# Patient Record
Sex: FEMALE | Race: WHITE | NOT HISPANIC OR LATINO | Employment: OTHER | URBAN - METROPOLITAN AREA
[De-identification: names, ages, dates, MRNs, and addresses within clinical notes are randomized per-mention and may not be internally consistent; named-entity substitution may affect disease eponyms.]

---

## 2021-09-18 ENCOUNTER — OFFICE VISIT (OUTPATIENT)
Dept: FAMILY MEDICINE CLINIC | Facility: CLINIC | Age: 31
End: 2021-09-18

## 2021-09-18 VITALS
HEIGHT: 65 IN | OXYGEN SATURATION: 98 % | WEIGHT: 105.8 LBS | SYSTOLIC BLOOD PRESSURE: 118 MMHG | BODY MASS INDEX: 17.63 KG/M2 | DIASTOLIC BLOOD PRESSURE: 60 MMHG | HEART RATE: 56 BPM

## 2021-09-18 DIAGNOSIS — R10.9 CHRONIC ABDOMINAL PAIN: ICD-10-CM

## 2021-09-18 DIAGNOSIS — R63.6 UNDERWEIGHT: ICD-10-CM

## 2021-09-18 DIAGNOSIS — E34.8 CYST OF PINEAL GLAND: ICD-10-CM

## 2021-09-18 DIAGNOSIS — E88.89 CYP2D6 POOR METABOLIZER (HCC): ICD-10-CM

## 2021-09-18 DIAGNOSIS — F07.81 POSTCONCUSSION SYNDROME: ICD-10-CM

## 2021-09-18 DIAGNOSIS — R11.0 CHRONIC NAUSEA: Primary | ICD-10-CM

## 2021-09-18 DIAGNOSIS — G54.1 GENITOFEMORAL NEUROPATHY: ICD-10-CM

## 2021-09-18 DIAGNOSIS — G89.29 CHRONIC ABDOMINAL PAIN: ICD-10-CM

## 2021-09-18 DIAGNOSIS — G43.719 CHRONIC MIGRAINE WITHOUT AURA, INTRACTABLE, WITHOUT STATUS MIGRAINOSUS: ICD-10-CM

## 2021-09-18 PROBLEM — G44.309 POST-TRAUMATIC HEADACHE: Status: ACTIVE | Noted: 2020-04-16

## 2021-09-18 PROBLEM — G57.80: Status: ACTIVE | Noted: 2021-09-18

## 2021-09-18 PROBLEM — F32.A DEPRESSION: Status: ACTIVE | Noted: 2019-09-03

## 2021-09-18 PROCEDURE — 99204 OFFICE O/P NEW MOD 45 MIN: CPT | Performed by: FAMILY MEDICINE

## 2021-09-18 RX ORDER — ONDANSETRON 4 MG/1
4 TABLET, FILM COATED ORAL
COMMUNITY
Start: 2021-08-12 | End: 2021-09-18

## 2021-09-18 RX ORDER — DICYCLOMINE HYDROCHLORIDE 10 MG/1
CAPSULE ORAL
COMMUNITY
Start: 2021-08-27 | End: 2021-09-18

## 2021-09-18 RX ORDER — CLONAZEPAM 0.5 MG/1
1 TABLET ORAL
COMMUNITY
Start: 2021-05-12

## 2021-09-18 RX ORDER — ONDANSETRON 4 MG/1
4 TABLET, ORALLY DISINTEGRATING ORAL EVERY 6 HOURS
Qty: 120 TABLET | Refills: 3 | Status: SHIPPED | OUTPATIENT
Start: 2021-09-18 | End: 2022-02-09

## 2021-09-18 NOTE — PROGRESS NOTES
"Chief Complaint  Establish Care, Ulcerative Colitis, Scoliosis, Insomnia, and Migraine    Subjective          Josephine Taylor presents to [facility]  History of Present Illness     Her last doctor left and is establishing care today with new doctor.     She has CYP2D6 duplicate enzyme ultra slow metabolize and she has a hard time metabolizing medication and food. Hard for her to eat. Psychiatrist and counselor. She can't gain weight. Discovered in past 2 years.     Neurology clinic at  in the past. She saw neurosurgeon. She has been referred for concussion.     Her whole body is out of wack. Panic disorder, PTSD under care of psychiatry and counselor [facility]. Recommend to see dietician. She doesn't have energy. She has constant nausea, uses zofran 2-4 a day and night and ODT worked better. Unknown etiology. She had colitis with antibiotics. She was at 115 lbs 2 weeks ago, she has lost weight from not eating with nausea.     She had injections at pain management genitofemoral nerve left. When she had injection she had numbness in genital region as well.     She had a coil in renal area to help pain, which later turned out to be neuropathy. She thinks there is an issue.         Objective   Vital Signs:   /60   Pulse 56   Ht 164 cm (64.57\")   Wt 48 kg (105 lb 12.8 oz)   SpO2 98%   BMI 17.84 kg/m²     Physical Exam  Constitutional:       General: She is not in acute distress.     Appearance: She is underweight.   Abdominal:      General: Abdomen is flat. Bowel sounds are normal.      Palpations: There is no hepatomegaly.      Tenderness: There is no abdominal tenderness.   Psychiatric:         Mood and Affect: Mood normal.        Result Review :               Reviewed neurology office note 6/10/2021: Chronic migraines, pineal cyst, post concussion  Assessment and Plan    Diagnoses and all orders for this visit:    1. Chronic nausea (Primary)  -     " Ambulatory Referral to Gastroenterology  -     ondansetron ODT (Zofran ODT) 4 MG disintegrating tablet; Place 1 tablet on the tongue Every 6 (Six) Hours.  Dispense: 120 tablet; Refill: 3  -     Ambulatory Referral to Nutrition Services  Further work-up with GI. Zofran for symptomatic relief, but discussed no more than 4 doses in 24 hours.   2. Chronic abdominal pain  -     Ambulatory Referral to Gastroenterology  -     Ambulatory Referral to Nutrition Services  Further work-up with GI.  Obtain prior records of colitis. She may also need a colonoscopy.   3. Postconcussion syndrome  -     Ambulatory Referral to Neurology  Establish care with neurology.   4. CYP2D6 poor metabolizer (CMS/HCC)  Patient has records with gene testing, copy requested.   5. Body mass index (BMI) 19.9 or less, adult  -     Ambulatory Referral to Nutrition Services  She is interested in meeting with dietician.   Patient's Body mass index is 17.84 kg/m². indicating that she is underweight (BMI < 18.5). Recommendations include: referral to dietitian.    6. Underweight  -     Ambulatory Referral to Nutrition Services  She is interested in meeting with dietician.   Patient's Body mass index is 17.84 kg/m². indicating that she is underweight (BMI < 18.5). Recommendations include: referral to dietitian.    7. Chronic migraine without aura, intractable, without status migrainosus  -     Ambulatory Referral to Neurology  Establish care with neurology.   8. Cyst of pineal gland  -     Ambulatory Referral to Neurology  Establish care with neurology.   9. Genitofemoral neuropathy  -     Ambulatory Referral to Pain Management  Obtain records with prior imaging. Second opinion about procedures for management of symptoms.       Follow Up   Return if symptoms worsen or fail to improve.  Patient was given instructions and counseling regarding her condition or for health maintenance advice. Please see specific information pulled into the AVS if appropriate.      Electronically signed by Faye Zavaleta MD, 09/18/21, 12:40 PM EDT.

## 2021-09-30 ENCOUNTER — OFFICE VISIT (OUTPATIENT)
Dept: GASTROENTEROLOGY | Facility: CLINIC | Age: 31
End: 2021-09-30

## 2021-09-30 VITALS
DIASTOLIC BLOOD PRESSURE: 66 MMHG | HEART RATE: 73 BPM | SYSTOLIC BLOOD PRESSURE: 118 MMHG | BODY MASS INDEX: 17.59 KG/M2 | WEIGHT: 105.6 LBS | OXYGEN SATURATION: 96 % | TEMPERATURE: 98.4 F | HEIGHT: 65 IN

## 2021-09-30 DIAGNOSIS — R63.6 UNDERWEIGHT: ICD-10-CM

## 2021-09-30 DIAGNOSIS — R11.0 CHRONIC NAUSEA: ICD-10-CM

## 2021-09-30 DIAGNOSIS — G89.29 CHRONIC ABDOMINAL PAIN: ICD-10-CM

## 2021-09-30 DIAGNOSIS — K59.09 CHRONIC CONSTIPATION: Primary | ICD-10-CM

## 2021-09-30 DIAGNOSIS — R10.9 CHRONIC ABDOMINAL PAIN: ICD-10-CM

## 2021-09-30 PROCEDURE — 99204 OFFICE O/P NEW MOD 45 MIN: CPT | Performed by: NURSE PRACTITIONER

## 2021-09-30 RX ORDER — DICYCLOMINE HYDROCHLORIDE 10 MG/1
10 CAPSULE ORAL 4 TIMES DAILY
COMMUNITY
Start: 2021-09-23 | End: 2022-05-03

## 2021-09-30 NOTE — PROGRESS NOTES
New Patient Consultation     Patient Name: Josephine Taylor  : 1990   MRN: [id number]     Chief Complaint:    Chief Complaint   Patient presents with   • Constipation   • Abdominal Pain     left lower        History of Present Illness: Josephine Taylor is a 30 y.o. female who is here today for a Gastroenterology Consultation for nausea and abdominal pain.    This has been ongoing for about 5 years.  She reports daily constant nausea.  She takes zofran daily.  There is decreased appetite due to nausea.  Reports unintentional weight loss and inability to gain weight.  The pain is rated in the mid abdomen.  She does report chronic constipation with occasionally having a normal BM.  There is no diarrhea.Sometimes has 3 Bms bristol scale 1 in 1 day and other times will go a week without a BM.  Has tried miralax in the past which does not help.  Bentyl helps her pain.    She does have joint swelling and vision problems.  She does sometimes get a rash that looks like hives.  There is no blood in stool but has had this in the past.  NSAIDs worsen her symptoms.  She does have a history of colitis requiring hospitalization and IV antibiotics in 2019.  She has never had a colonoscopy.  There is no heartburn or dysphagia    No family history of IBD or colon cancer.   Subjective      Review of Systems:   Review of Systems   Constitutional: Positive for appetite change and unexpected weight loss.   HENT: Negative for trouble swallowing.    Eyes: Positive for visual disturbance.   Gastrointestinal: Positive for abdominal pain, constipation and nausea. Negative for abdominal distention, anal bleeding, blood in stool, diarrhea, rectal pain, vomiting, GERD and indigestion.   Musculoskeletal: Positive for arthralgias and joint swelling.   Skin: Positive for rash.        No rash currently         Past Medical History:   Past Medical History:   Diagnosis Date   • Anxiety    • Arthritis    • Chronic abdominal pain     • Chronic nausea    • Colitis    • CYP2D6 poor metabolizer (HCC)    • Depression    • Fibromyalgia, primary    • Genitofemoral neuropathy     left   • Insomnia    • Kidney infection    • Migraines    • Panic disorder    • Postconcussion syndrome    • PTSD (post-traumatic stress disorder)    • Scoliosis    • Traumatic brain injury (CMS/HCC) 2019   • Vitamin D deficiency        Past Surgical History:   Past Surgical History:   Procedure Laterality Date   • ABDOMINAL SURGERY      coil renal?    • BREAST LUMPECTOMY     •  SECTION         Family History:   Family History   Problem Relation Age of Onset   • Arthritis Mother    • Mental illness Mother    • Heart attack Father    • Mental illness Father    • Migraines Sister    • Melanoma Maternal Grandmother    • Brain cancer Maternal Grandfather    • Colon cancer Neg Hx    • Colon polyps Neg Hx    • Esophageal cancer Neg Hx        Social History:   Social History     Socioeconomic History   • Marital status: Other     Spouse name: Not on file   • Number of children: Not on file   • Years of education: Not on file   • Highest education level: Not on file   Tobacco Use   • Smoking status: Current Every Day Smoker     Packs/day: 1.00     Years: 15.     Pack years: 15.     Types: Cigarettes     Start date:    • Smokeless tobacco: Never Used   Vaping Use   • Vaping Use: Never used   Substance and Sexual Activity   • Alcohol use: Never   • Drug use: Yes     Types: Marijuana     Comment: Pt states that she smokes about a few times a week.       Alcohol/Tobacco History:   Social History     Substance and Sexual Activity   Alcohol Use Never     Social History     Tobacco Use   Smoking Status Current Every Day Smoker   • Packs/day: 1.00   • Years: 15.   • Pack years: 15.   • Types: Cigarettes   • Start date:    Smokeless Tobacco Never Used       Medications:     Current Outpatient Medications:   •  clonazePAM (KlonoPIN) 0.5 MG tablet, Take 1 tablet by  "mouth., Disp: , Rfl:   •  dicyclomine (BENTYL) 10 MG capsule, Take 10 mg by mouth 4 (Four) Times a Day., Disp: , Rfl:   •  ondansetron ODT (Zofran ODT) 4 MG disintegrating tablet, Place 1 tablet on the tongue Every 6 (Six) Hours. (Patient taking differently: Place 4 mg on the tongue As Needed.), Disp: 120 tablet, Rfl: 3  •  linaclotide (Linzess) 290 MCG capsule capsule, Take 1 capsule by mouth Every Morning Before Breakfast., Disp: 30 capsule, Rfl: 5    Allergies:   Allergies   Allergen Reactions   • Galcanezumab-Gnlm Shortness Of Breath   • Aimovig [Erenumab-Aooe] Other (See Comments)     Told to avoid due to hives, SOA with Emgality   • Sulfa Antibiotics Hives   • Ciprofloxacin Unknown - High Severity       Objective     Physical Exam:  Vital Signs:   Vitals:    09/30/21 1108   BP: 118/66   BP Location: Right arm   Patient Position: Sitting   Cuff Size: Adult   Pulse: 73   Temp: 98.4 °F (36.9 °C)   TempSrc: Temporal   SpO2: 96%   Weight: 47.9 kg (105 lb 9.6 oz)   Height: 164 cm (64.57\")     Body mass index is 17.81 kg/m².     Physical Exam  Vitals and nursing note reviewed.   Constitutional:       General: She is not in acute distress.     Appearance: She is well-developed and overweight. She is not diaphoretic.   Eyes:      General: No scleral icterus.     Extraocular Movements:      Right eye: No nystagmus.      Left eye: No nystagmus.      Conjunctiva/sclera: Conjunctivae normal.      Pupils: Pupils are equal, round, and reactive to light.   Neck:      Thyroid: No thyromegaly.   Cardiovascular:      Rate and Rhythm: Normal rate and regular rhythm.   Pulmonary:      Effort: Pulmonary effort is normal.      Breath sounds: Normal breath sounds.   Abdominal:      General: A surgical scar is present. Bowel sounds are normal. There is no distension. There are no signs of injury.      Palpations: Abdomen is soft. There is no hepatomegaly or splenomegaly.      Tenderness: There is abdominal tenderness in the " periumbilical area, suprapubic area and left lower quadrant. There is no guarding or rebound.      Hernia: No hernia is present.   Musculoskeletal:      Cervical back: Neck supple.      Right lower leg: No edema.      Left lower leg: No edema.   Skin:     General: Skin is warm and dry.      Capillary Refill: Capillary refill takes 2 to 3 seconds.      Coloration: Skin is not jaundiced or pale.      Findings: No bruising or petechiae.      Nails: There is no clubbing.   Neurological:      Mental Status: She is alert and oriented to person, place, and time.   Psychiatric:         Behavior: Behavior normal.         Thought Content: Thought content normal.         Judgment: Judgment normal.         Assessment / Plan      Assessment/Plan:   Diagnoses and all orders for this visit:    1. Chronic constipation (Primary)  -     Ambulatory referral for Screening EGD  -     Ambulatory Referral For Screening Colonoscopy  -     linaclotide (Linzess) 290 MCG capsule capsule; Take 1 capsule by mouth Every Morning Before Breakfast.  Dispense: 30 capsule; Refill: 5    2. Chronic abdominal pain  -     Ambulatory referral for Screening EGD  -     Ambulatory Referral For Screening Colonoscopy  May continue with Bentyl-patient aware this may cause constipation  3. Chronic nausea  -     Ambulatory referral for Screening EGD  -     Ambulatory Referral For Screening Colonoscopy  -     NM Gastric Emptying; Future  Recommend gingerroot and Zofran as needed  4. Underweight  -     Ambulatory referral for Screening EGD  -     Ambulatory Referral For Screening Colonoscopy       Recommend EGD and colonoscopy, when scheduling after visit, patient was informed she would need to have a Covid test prior to procedure.  She declined doing the procedure due to this.  We will plan to proceed with gastric emptying study and treat constipation.  We will see her back in 6 weeks and rediscuss scope at that time    Follow Up:   Return in about 6 weeks (around  11/11/2021).    Plan of care reviewed with the patient at the conclusion of today's visit.  Education was provided regarding diagnosis, management, and any prescribed or recommended OTC medications.  Patient verbalized understanding of and agreement with management plan.       PEDRITO Randhawa  [facility]

## 2021-10-04 ENCOUNTER — TELEPHONE (OUTPATIENT)
Dept: GASTROENTEROLOGY | Facility: CLINIC | Age: 31
End: 2021-10-04

## 2021-10-05 ENCOUNTER — TRANSCRIBE ORDERS (OUTPATIENT)
Dept: GENERAL RADIOLOGY | Facility: HOSPITAL | Age: 31
End: 2021-10-05

## 2021-10-05 ENCOUNTER — HOSPITAL ENCOUNTER (OUTPATIENT)
Dept: GENERAL RADIOLOGY | Facility: HOSPITAL | Age: 31
Discharge: HOME | End: 2021-10-05
Admitting: ANESTHESIOLOGY

## 2021-10-05 DIAGNOSIS — G57.22 LESION OF LEFT FEMORAL NERVE: Primary | ICD-10-CM

## 2021-10-05 DIAGNOSIS — G57.22 LESION OF LEFT FEMORAL NERVE: ICD-10-CM

## 2021-10-05 PROCEDURE — 72100 X-RAY EXAM L-S SPINE 2/3 VWS: CPT

## 2021-10-06 ENCOUNTER — TELEPHONE (OUTPATIENT)
Dept: GASTROENTEROLOGY | Facility: CLINIC | Age: 31
End: 2021-10-06

## 2021-10-06 NOTE — TELEPHONE ENCOUNTER
ASKED FOR LAWRENCE AND VERIFIED HER DATE OF BIRTH. ASKED IF THIS WAS A GOOD TIME TO SPEAK WITH HER ABOUT HER RECENT VISIT. SHE AGREED.     WHAT WENT WELL WITH YOUR VISIT?- PEDRITO HALE WAS AWESOME, SUPER NICE,  AND LOOKED INTO ALL OF HER SYMPTOMS MORE THAN ANY DOCTOR SHE HAS EVER HAD.     WHAT ARE SOME THINGS WE CAN IMPROVE ON?   SO MUCH OF A BETTER EXPERIENCE THAN ANY OTHER HOSPITAL THAT SHE DOES NOT HAVE ANY COMPLAINTS.     I INFORMED THE PATIENT THAT IF SHE NEEDED ANYTHING FURTHER FROM OUR OFFICE TO PLEASE CONTACT US [phone] OPTION 5.    SHE THANKED ME FOR MY CALL AND THE CALL ENDED PLEASANTLY.

## 2021-10-11 ENCOUNTER — PATIENT ROUNDING (BHMG ONLY) (OUTPATIENT)
Dept: GASTROENTEROLOGY | Facility: CLINIC | Age: 31
End: 2021-10-11

## 2021-10-11 NOTE — PROGRESS NOTES
ASKED FOR LAWRENCE AND VERIFIED HER DATE OF BIRTH. ASKED IF THIS WAS A GOOD TIME TO SPEAK WITH HER ABOUT HER RECENT VISIT. SHE AGREED.      WHAT WENT WELL WITH YOUR VISIT?- PEDRITO HALE WAS AWESOME, SUPER NICE,  AND LOOKED INTO ALL OF HER SYMPTOMS MORE THAN ANY DOCTOR SHE HAS EVER HAD.      WHAT ARE SOME THINGS WE CAN IMPROVE ON?   SO MUCH OF A BETTER EXPERIENCE THAN ANY OTHER HOSPITAL THAT SHE DOES NOT HAVE ANY COMPLAINTS.      I INFORMED THE PATIENT THAT IF SHE NEEDED ANYTHING FURTHER FROM OUR OFFICE TO PLEASE CONTACT US [phone] OPTION 5.     SHE THANKED ME FOR MY CALL AND THE CALL ENDED PLEASANTLY.

## 2021-11-03 ENCOUNTER — HOSPITAL ENCOUNTER (OUTPATIENT)
Dept: NUTRITION | Facility: HOSPITAL | Age: 31
Setting detail: RECURRING SERIES
Discharge: HOME | End: 2021-11-03

## 2021-11-03 VITALS — BODY MASS INDEX: 17.49 KG/M2 | WEIGHT: 105 LBS | HEIGHT: 65 IN

## 2021-11-03 PROCEDURE — 97802 MEDICAL NUTRITION INDIV IN: CPT | Performed by: DIETITIAN, REGISTERED

## 2021-11-03 NOTE — CONSULTS
Adult Outpatient Nutrition  Assessment/PES    Patient Name:  Josephine Taylor  YOB: 1990  MRN: [id number]    Assessment Date:  11/3/2021    Telehealth nutrition consult, 60 minutes. This medical referred consult was provided as a tele-health or e-visit, as patient is unable to attend an in-office appointment due to the COVID-19 crisis. Consent for treatment was given verbally.    Comments: Patient is present for nutrition counseling related to underweight. Patient reports difficulty maintaining and gaining weight, and her psychiatrist encouraged having an appointment with RD. Patient reports gaining to 135 lbs a few years back, but now fluctuates from  lbs. BMI is 17.5. Patient describes problems with GI issues - some constipation, nausea, and undigested foods in stool (primarily leafy greens/raw veggies). NKFA but she is considering having testing done (does experience hives). Other medical history includes neuropathy, fibromyalgia, insomnia, anxiety, PTSD, scoliosis, and post concussive syndrome. She is not taking any nutrition supplements. She does experience low appetite some days, and typically only eats 1-2 meals most days. Patient will skip breakfast, have a small lunch/snack, and fixes a dinner (meat, starch, veg) at home. Drinks water, coke, juice, and tea. Patient is very active and says she has a high metabolism. Also states she has CYP2D6 enzyme deficiency/poor metabolizer. Today patient wants to obtain information on strategies for weight gain with her food intolerance issues. She does indicate financial stress, and currently uses Food Assistance programs. No other barriers to learning.     To help accommodate patient's decreased appetite, RD recommends a small, frequent meal pattern with high calorie/high protein foods and incorporating caloric beverages. Estimated energy needs per [address] or is 2,000 - 2,100 calories per day (1lb weight gain per week). Patient has  tracked calories in the past and found eating upwards of 3,500 calories per day was most beneficial for weight gain. She is open to tracking her calories again. RD also suggested patient keep note of her GI symptoms to try and identify possible food triggers, and patient is open to this. She may benefit from the allergy testing as well. Because of undigested food in stool, RD advises switching to mostly canned and cooked vegetables and fruits, or adding them into smoothies. Patient is receptive to the idea of high calorie shakes and smoothies in the morning or when appetite is decreased. Suggested pre-made shakes, and will provide recipes for homemade options. Advised she limit juices and soda (as acidity or carbonation may be a source of GI triggers) but not eliminate it as it provides some calories. Overall patient is receptive to our discussion and appears very willing to try suggestions discussed.     Consent given to e-mail materials, including high calorie sample menus, and supporting nutrition education materials on weight gain.     Goals:  1. Work toward more frequent eating pattern. Consider calorie counting.   2. Switch to more cooked/canned fruits and vegetables.   3. Gradual weight gain.     Total of 50 minutes spent with patient on nutrition counseling. Education based on Academy of Dietetics and Nutrition guidelines. Patient was provided with RD's contact information. Follow up visit is scheduled for 11/24 at 9:00 a.m. Thank you for this referral.     General Info     Row Name 11/03/21 100       Today's Session    Person(s) attending today's session Patient     Services Used Today? No       General Information    How Well Do You Speak English? very well    Do You Speak a Language Other Than English at Home? no    Preferred Language English    Are you able to read and write English? Yes    Is patient pregnant? no               Physical Findings     Row Name 11/03/21 1000          Physical  "Findings    Overall Physical Appearance underweight     Gastrointestinal constipation; nausea                Anthropometrics     Row Name 11/03/21 1009          Anthropometrics    Height 165.1 cm (65\")     Weight 47.6 kg (105 lb)            Ideal Body Weight (IBW)    Ideal Body Weight (IBW) (kg) 57.29     % Ideal Body Weight 83.13            Body Mass Index (BMI)    BMI (kg/m2) 17.51                Nutritional Info/Activity     Row Name 11/03/21 1010       Nutritional Information    Have you had weight changes? Yes    Describe weight changes Unintentional weight loss    What is your desired body weight? 61.2 kg (135 lb)    Have you tried to lose weight before? No    What is your motivation to lose weight? N/A    Supplemental Drinks/Foods/Additives None    History of eating disorder? No    What cultural diet influences are important for you to follow? NOne    Do you have difficulty chewing food? No    Functional Status able to prepare meals; able to purchase food; ambulatory    How often during the day do you find yourself snacking? maybe 1 time/day    How often do you eat out and where? 0-1 time/week    Do you use Food Assistance programs (WIC, food stamps, food bank)? yes    Do you need information about Food Assistance programs? no    How many times do you drink milk per day? 0    How many times do you eat fruit per day? 0    How many times do you eat vegetables per day? 1    How many times do you drink juice per day? 1    How many times do you eat candy/chocolates per day? 0    How many times do you eat baked goods per day? 0    How many times do you eat desserts per day? 0    How many times do you eat ice cream per day? 0    How many times do you eat snack foods per day? 1    How many diet sodas do you drink per day? 0    How many regular sodas do you drink per day? 1    How many times do you eat ethnic food per day? 0    How many times do you drink alcohol per day? 0    How many times do you have caffeine per " "day? 0    How many servings of artificial sweetner do you have per day? 0    How many meals do you eat each day? 2  1-2    How many snacks do you eat each day? 0  0-1    What is the biggest challenge you have with your diet? Other (comment)  Not able to gain weight; possible food intolerances    Enter everything you can remember eating in the last 24 hours (1 day) Breakfast: None; AM snack: Cheese stick; Lunch: 1 pack ramen noodles; Dinner: Burger, fried potatoes, 1/4c peas               Home Nutrition Report     Row Name 11/03/21 1010          Home Nutrition Report    Supplemental Drinks/Foods/Additives None                Estimated/Assessed Needs     Row Name 11/03/21 1009          Calculation Measurements    Weight Used For Calculations 47.6 kg (105 lb)     Height 165.1 cm (65\")            Estimated/Assessed Needs    Additional Documentation [place]-St. Jeor Equation (Group)            [place]-St. Jeor Equation    RMR ([place]-St. Jeor Equation) 1197.155     [place]-St. Jeor Activity Factors 1.4 - 1.5     Activity Factors ([place]-St. Jeor) 9622.775 - 4133.7325                       Problem/Interventions:   Problem 1     Row Name 11/03/21 1017          Nutrition Diagnoses Problem 1    Problem 1 Underweight     Etiology (related to) Factors Affecting Nutrition     Appetite Fair     Food Habit/Preferences Small Meals     Signs/Symptoms (evidenced by) BMI     BMI 17 - 17.9                        Intervention Goal     Row Name 11/03/21 1102          Intervention Goal    General Meet nutritional needs for age/condition     PO Meet estimated needs     Weight Appropriate weight gain                  Nutrition Prescription     Row Name 11/03/21 1102          Nutrition Prescription PO    PO Prescription Begin/change diet     Begin/Change Diet to Regular     Fluid Consistency Thin     Other Modifiers High protein/high calorie     New PO Prescription Ordered? No, recommended                Education/Evaluation     Row Name " 11/03/21 1102          Education    Education Education topics; Provided education regarding; Advised regarding habits/behavior     Provided education regarding Diet rationale     Education Topics Weight gain strategy     Advised Regarding Habits/Behavior Eating pattern; Food choices; Use supplement            Monitor/Evaluation    Monitor Per protocol     Education Follow-up Other (comment)  11/24 at 9:00 a.m.                 Electronically signed by:  Reyna Herrera RD  11/03/21 11:04 EDT

## 2021-11-09 ENCOUNTER — LAB (OUTPATIENT)
Dept: LAB | Facility: HOSPITAL | Age: 31
End: 2021-11-09

## 2021-11-09 ENCOUNTER — OFFICE VISIT (OUTPATIENT)
Dept: FAMILY MEDICINE CLINIC | Facility: CLINIC | Age: 31
End: 2021-11-09

## 2021-11-09 VITALS
OXYGEN SATURATION: 98 % | DIASTOLIC BLOOD PRESSURE: 64 MMHG | HEART RATE: 69 BPM | SYSTOLIC BLOOD PRESSURE: 110 MMHG | WEIGHT: 108.2 LBS | BODY MASS INDEX: 18.03 KG/M2 | HEIGHT: 65 IN

## 2021-11-09 DIAGNOSIS — Z11.3 ROUTINE SCREENING FOR STI (SEXUALLY TRANSMITTED INFECTION): ICD-10-CM

## 2021-11-09 DIAGNOSIS — Z12.4 SCREENING FOR CERVICAL CANCER: ICD-10-CM

## 2021-11-09 DIAGNOSIS — Z00.00 WELL ADULT EXAM: ICD-10-CM

## 2021-11-09 DIAGNOSIS — N63.0 BREAST MASS: ICD-10-CM

## 2021-11-09 DIAGNOSIS — N90.89 LABIAL LESION: ICD-10-CM

## 2021-11-09 DIAGNOSIS — Z00.00 WELL ADULT EXAM: Primary | ICD-10-CM

## 2021-11-09 LAB
ALBUMIN SERPL-MCNC: 4.3 G/DL (ref 3.5–5.2)
ALBUMIN/GLOB SERPL: 1.8 G/DL
ALP SERPL-CCNC: 65 U/L (ref 39–117)
ALT SERPL W P-5'-P-CCNC: 12 U/L (ref 1–33)
ANION GAP SERPL CALCULATED.3IONS-SCNC: 4.3 MMOL/L (ref 5–15)
AST SERPL-CCNC: 17 U/L (ref 1–32)
BILIRUB SERPL-MCNC: <0.2 MG/DL (ref 0–1.2)
BUN SERPL-MCNC: 13 MG/DL (ref 6–20)
BUN/CREAT SERPL: 15.5 (ref 7–25)
CALCIUM SPEC-SCNC: 9.2 MG/DL (ref 8.6–10.5)
CHLORIDE SERPL-SCNC: 105 MMOL/L (ref 98–107)
CHOLEST SERPL-MCNC: 188 MG/DL (ref 0–200)
CO2 SERPL-SCNC: 28.7 MMOL/L (ref 22–29)
CREAT SERPL-MCNC: 0.84 MG/DL (ref 0.57–1)
DEPRECATED RDW RBC AUTO: 39.6 FL (ref 37–54)
ERYTHROCYTE [DISTWIDTH] IN BLOOD BY AUTOMATED COUNT: 11.5 % (ref 12.3–15.4)
GFR SERPL CREATININE-BSD FRML MDRD: 80 ML/MIN/1.73
GLOBULIN UR ELPH-MCNC: 2.4 GM/DL
GLUCOSE SERPL-MCNC: 82 MG/DL (ref 65–99)
HCT VFR BLD AUTO: 39.8 % (ref 34–46.6)
HCV AB SER DONR QL: NORMAL
HDLC SERPL-MCNC: 59 MG/DL (ref 40–60)
HGB BLD-MCNC: 13.7 G/DL (ref 12–15.9)
HIV1+2 AB SER QL: NORMAL
LDLC SERPL CALC-MCNC: 120 MG/DL (ref 0–100)
LDLC/HDLC SERPL: 2.02 {RATIO}
MCH RBC QN AUTO: 32.6 PG (ref 26.6–33)
MCHC RBC AUTO-ENTMCNC: 34.4 G/DL (ref 31.5–35.7)
MCV RBC AUTO: 94.8 FL (ref 79–97)
PLATELET # BLD AUTO: 237 10*3/MM3 (ref 140–450)
PMV BLD AUTO: 11.5 FL (ref 6–12)
POTASSIUM SERPL-SCNC: 4.5 MMOL/L (ref 3.5–5.2)
PROT SERPL-MCNC: 6.7 G/DL (ref 6–8.5)
RBC # BLD AUTO: 4.2 10*6/MM3 (ref 3.77–5.28)
SODIUM SERPL-SCNC: 138 MMOL/L (ref 136–145)
T4 FREE SERPL-MCNC: 1.21 NG/DL (ref 0.93–1.7)
TRIGL SERPL-MCNC: 49 MG/DL (ref 0–150)
TSH SERPL DL<=0.05 MIU/L-ACNC: 2.18 UIU/ML (ref 0.27–4.2)
VLDLC SERPL-MCNC: 9 MG/DL (ref 5–40)
WBC # BLD AUTO: 4.64 10*3/MM3 (ref 3.4–10.8)

## 2021-11-09 PROCEDURE — G0432 EIA HIV-1/HIV-2 SCREEN: HCPCS

## 2021-11-09 PROCEDURE — 87798 DETECT AGENT NOS DNA AMP: CPT | Performed by: FAMILY MEDICINE

## 2021-11-09 PROCEDURE — 87340 HEPATITIS B SURFACE AG IA: CPT

## 2021-11-09 PROCEDURE — 86704 HEP B CORE ANTIBODY TOTAL: CPT

## 2021-11-09 PROCEDURE — 87591 N.GONORRHOEAE DNA AMP PROB: CPT | Performed by: FAMILY MEDICINE

## 2021-11-09 PROCEDURE — 87350 HEPATITIS BE AG IA: CPT

## 2021-11-09 PROCEDURE — 86706 HEP B SURFACE ANTIBODY: CPT

## 2021-11-09 PROCEDURE — 80061 LIPID PANEL: CPT

## 2021-11-09 PROCEDURE — 84439 ASSAY OF FREE THYROXINE: CPT

## 2021-11-09 PROCEDURE — 86592 SYPHILIS TEST NON-TREP QUAL: CPT

## 2021-11-09 PROCEDURE — 36415 COLL VENOUS BLD VENIPUNCTURE: CPT

## 2021-11-09 PROCEDURE — 86707 HEPATITIS BE ANTIBODY: CPT

## 2021-11-09 PROCEDURE — 87661 TRICHOMONAS VAGINALIS AMPLIF: CPT | Performed by: FAMILY MEDICINE

## 2021-11-09 PROCEDURE — 3008F BODY MASS INDEX DOCD: CPT | Performed by: FAMILY MEDICINE

## 2021-11-09 PROCEDURE — 86803 HEPATITIS C AB TEST: CPT

## 2021-11-09 PROCEDURE — 80050 GENERAL HEALTH PANEL: CPT

## 2021-11-09 PROCEDURE — 86705 HEP B CORE ANTIBODY IGM: CPT

## 2021-11-09 PROCEDURE — 99395 PREV VISIT EST AGE 18-39: CPT | Performed by: FAMILY MEDICINE

## 2021-11-09 PROCEDURE — 87491 CHLMYD TRACH DNA AMP PROBE: CPT | Performed by: FAMILY MEDICINE

## 2021-11-09 PROCEDURE — 2014F MENTAL STATUS ASSESS: CPT | Performed by: FAMILY MEDICINE

## 2021-11-09 PROCEDURE — 87801 DETECT AGNT MULT DNA AMPLI: CPT | Performed by: FAMILY MEDICINE

## 2021-11-09 RX ORDER — METHYLPREDNISOLONE 4 MG/1
TABLET ORAL
COMMUNITY
Start: 2021-11-04 | End: 2022-05-03

## 2021-11-09 NOTE — PROGRESS NOTES
"Chief Complaint  Annual Exam (Pt refuses flu vaccine today. ) and Gynecologic Exam    Subjective          Josephine Taylor presents to [facility] for   History of Present Illness    New sexual partner. Right side labial lesion 2 days. Swelling and tender to the touch. Yesterday yellow vaginal discharge. No bleeding. LMP within the week, finished 4 days ago.     Counselor and psychiatrist want hormone level tested. Thought symptoms has something to do with hypothalamus.     She had prior lumpectomy. No breast pain. She feels a second lump on left is solid.             Objective   Vital Signs:   Vitals:    11/09/21 0821   BP: 110/64   Pulse: 69   SpO2: 98%   Weight: 49.1 kg (108 lb 3.2 oz)   Height: 165.1 cm (65\")   PainSc:   6     Body mass index is 18.01 kg/m².      Physical Exam  Vitals reviewed. Exam conducted with a chaperone present.   Constitutional:       General: She is not in acute distress.     Appearance: She is not ill-appearing.   HENT:      Right Ear: Tympanic membrane and ear canal normal.      Left Ear: Tympanic membrane and ear canal normal.   Eyes:      General:         Right eye: No discharge.         Left eye: No discharge.      Conjunctiva/sclera: Conjunctivae normal.   Neck:      Thyroid: No thyromegaly.   Cardiovascular:      Rate and Rhythm: Normal rate and regular rhythm.   Pulmonary:      Effort: Pulmonary effort is normal. No respiratory distress.      Breath sounds: Normal breath sounds.   Chest:   Breasts:      Right: Mass ( 6:00 5mm rubbery non-tender mass) present. No nipple discharge, skin change, tenderness, axillary adenopathy or supraclavicular adenopathy.      Left: Mass ( 11:00 2cm mobile firm mass) present. No nipple discharge, skin change, tenderness, axillary adenopathy or supraclavicular adenopathy.       Abdominal:      Palpations: Abdomen is soft.      Tenderness: There is no abdominal tenderness.   Genitourinary:     Exam position: " Lithotomy position.      Pubic Area: No rash.       Labia:         Right: Lesion present.         Left: No lesion.       Urethra: No urethral lesion.      Vagina: Normal.      Cervix: Normal.      Uterus: Normal.       Adnexa:         Right: No mass or tenderness.          Left: No mass or tenderness.        Rectum: No external hemorrhoid.          Comments: Normal external genitalia    Musculoskeletal:      Cervical back: Neck supple.      Right lower leg: No edema.      Left lower leg: No edema.   Lymphadenopathy:      Cervical: No cervical adenopathy.      Right cervical: No superficial cervical adenopathy.     Left cervical: No superficial cervical adenopathy.      Upper Body:      Right upper body: No supraclavicular or axillary adenopathy.      Left upper body: No supraclavicular or axillary adenopathy.   Skin:     General: Skin is warm and dry.      Findings: No rash.   Neurological:      Mental Status: She is alert and oriented to person, place, and time.      Gait: Gait normal.   Psychiatric:         Mood and Affect: Mood normal.         Behavior: Behavior normal.         Thought Content: Thought content normal.         Judgment: Judgment normal.        Result Review :                Immunization History   Administered Date(s) Administered   • HPV Quadrivalent 06/29/2007   • Hepatitis A 09/03/2019, 09/03/2019   • Pneumococcal Polysaccharide (PPSV23) 09/03/2019, 09/03/2019   • Tdap 07/09/2012       Health Maintenance   Topic Date Due   • INFLUENZA VACCINE  Never done   • PAP SMEAR  Never done   • TDAP/TD VACCINES (2 - Td or Tdap) 07/09/2022            Assessment and Plan        Diagnoses and all orders for this visit:    1. Well adult exam (Primary)  -     Comprehensive Metabolic Panel; Future  -     CBC (No Diff); Future  -     TSH; Future  -     T4, free; Future  -     Lipid panel; Future    2. Screening for cervical cancer  -     IGP,rfx Aptima HPV All Pth; Future    3. Routine screening for STI (sexually  transmitted infection)  -     NuSwab VG+ - Swab, Vagina; Future  -     RPR; Future  -     HIV-1 / O / 2 Ag / Antibody 4th Generation; Future  -     Hepatitis C Antibody; Future  -     Hepatitis B Virus Profile; Future  -     NuSwab VG+ - Swab, Vagina    4. Labial lesion  New. No signs of abscess. Recommend sitz bath's. Rule out STI as above.    5. Breast mass  -     Mammo Diagnostic Digital Tomosynthesis Bilateral With CAD; Future  -     US Breast Bilateral Complete; Future  New. Patient has had a history of prior lumpectomy and has not had follow-up. Schedule imaging for further evaluation.      Counseled on health maintenance topics and preventative care recommendations: Cervical cancer screening, STI screening      Follow Up   Return in about 1 year (around 11/9/2022) for Physical.  Patient was given instructions and counseling regarding her condition or for health maintenance advice. Please see specific information pulled into the AVS if appropriate.      Electronically signed by Faye Zavaleta MD, 11/09/21, 8:43 AM EST.

## 2021-11-10 LAB
HBV CORE AB SERPL QL IA: NEGATIVE
HBV CORE IGM SERPL QL IA: NEGATIVE
HBV E AB SERPL QL IA: NEGATIVE
HBV E AG SERPL QL IA: NEGATIVE
HBV SURFACE AB SER QL: REACTIVE
HBV SURFACE AG SERPL QL IA: NEGATIVE
RPR SER QL: NORMAL

## 2021-11-11 LAB
A VAGINAE DNA VAG QL NAA+PROBE: ABNORMAL SCORE
BVAB2 DNA VAG QL NAA+PROBE: ABNORMAL SCORE
C ALBICANS DNA VAG QL NAA+PROBE: NEGATIVE
C GLABRATA DNA VAG QL NAA+PROBE: NEGATIVE
C TRACH DNA VAG QL NAA+PROBE: NEGATIVE
MEGA1 DNA VAG QL NAA+PROBE: ABNORMAL SCORE
N GONORRHOEA DNA VAG QL NAA+PROBE: NEGATIVE
T VAGINALIS DNA VAG QL NAA+PROBE: NEGATIVE

## 2021-11-12 ENCOUNTER — TELEPHONE (OUTPATIENT)
Dept: FAMILY MEDICINE CLINIC | Facility: CLINIC | Age: 31
End: 2021-11-12

## 2021-11-12 RX ORDER — METRONIDAZOLE 500 MG/1
500 TABLET ORAL 2 TIMES DAILY
Qty: 14 TABLET | Refills: 0 | Status: SHIPPED | OUTPATIENT
Start: 2021-11-12 | End: 2021-11-19

## 2021-11-12 NOTE — TELEPHONE ENCOUNTER
Vaginal swab shows a bacterial vaginosis infection.  It is not a sexually transmitted infection.  I am sending a prescription for metronidazole to take twice a day for a week to improve the vaginal discharge.

## 2021-11-30 ENCOUNTER — TRANSCRIBE ORDERS (OUTPATIENT)
Dept: ADMINISTRATIVE | Facility: HOSPITAL | Age: 31
End: 2021-11-30

## 2021-11-30 DIAGNOSIS — M54.16 LUMBAR RADICULOPATHY: Primary | ICD-10-CM

## 2021-12-13 ENCOUNTER — PRIOR AUTHORIZATION (OUTPATIENT)
Dept: GASTROENTEROLOGY | Facility: CLINIC | Age: 31
End: 2021-12-13

## 2021-12-13 DIAGNOSIS — Z12.11 SCREENING FOR COLON CANCER: Primary | ICD-10-CM

## 2021-12-21 ENCOUNTER — OFFICE VISIT (OUTPATIENT)
Dept: NEUROLOGY | Facility: CLINIC | Age: 31
End: 2021-12-21

## 2021-12-21 ENCOUNTER — TELEPHONE (OUTPATIENT)
Dept: NEUROLOGY | Facility: CLINIC | Age: 31
End: 2021-12-21

## 2021-12-21 VITALS
SYSTOLIC BLOOD PRESSURE: 112 MMHG | WEIGHT: 107 LBS | HEIGHT: 65 IN | DIASTOLIC BLOOD PRESSURE: 76 MMHG | HEART RATE: 77 BPM | OXYGEN SATURATION: 98 % | BODY MASS INDEX: 17.83 KG/M2

## 2021-12-21 DIAGNOSIS — H53.9 VISUAL CHANGES: ICD-10-CM

## 2021-12-21 DIAGNOSIS — M54.81 CERVICO-OCCIPITAL NEURALGIA OF RIGHT SIDE: ICD-10-CM

## 2021-12-21 DIAGNOSIS — F07.81 POSTCONCUSSIVE SYNDROME: ICD-10-CM

## 2021-12-21 DIAGNOSIS — G43.719 CHRONIC MIGRAINE WITHOUT AURA, INTRACTABLE, WITHOUT STATUS MIGRAINOSUS: Primary | ICD-10-CM

## 2021-12-21 DIAGNOSIS — H57.11 EYE PAIN, RIGHT: ICD-10-CM

## 2021-12-21 DIAGNOSIS — H21.561 PUPILLARY ABNORMALITIES, RIGHT: ICD-10-CM

## 2021-12-21 PROCEDURE — 99215 OFFICE O/P EST HI 40 MIN: CPT | Performed by: NURSE PRACTITIONER

## 2021-12-21 RX ORDER — UBROGEPANT 50 MG/1
TABLET ORAL
Qty: 10 TABLET | Refills: 5 | Status: SHIPPED | OUTPATIENT
Start: 2021-12-21 | End: 2022-05-03

## 2021-12-21 RX ORDER — AMPICILLIN TRIHYDRATE 250 MG
200 CAPSULE ORAL NIGHTLY
Qty: 30 CAPSULE | Refills: 5 | Status: SHIPPED | OUTPATIENT
Start: 2021-12-21 | End: 2022-05-03

## 2021-12-21 RX ORDER — MAGNESIUM OXIDE 400 MG/1
400 TABLET ORAL NIGHTLY
Qty: 30 TABLET | Refills: 5 | Status: SHIPPED | OUTPATIENT
Start: 2021-12-21 | End: 2022-05-03
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
## 2021-12-22 ENCOUNTER — PRIOR AUTHORIZATION (OUTPATIENT)
Dept: NEUROLOGY | Facility: CLINIC | Age: 31
End: 2021-12-22

## 2021-12-28 ENCOUNTER — HOSPITAL ENCOUNTER (OUTPATIENT)
Dept: MAMMOGRAPHY | Facility: HOSPITAL | Age: 31
Discharge: HOME | End: 2021-12-28

## 2021-12-28 ENCOUNTER — HOSPITAL ENCOUNTER (OUTPATIENT)
Dept: ULTRASOUND IMAGING | Facility: HOSPITAL | Age: 31
Discharge: HOME | End: 2021-12-28

## 2021-12-28 DIAGNOSIS — N63.0 BREAST MASS: ICD-10-CM

## 2021-12-28 PROCEDURE — 88341 IMHCHEM/IMCYTCHM EA ADD ANTB: CPT | Performed by: RADIOLOGY

## 2021-12-28 PROCEDURE — A4648 IMPLANTABLE TISSUE MARKER: HCPCS

## 2021-12-28 PROCEDURE — 19083 BX BREAST 1ST LESION US IMAG: CPT | Performed by: RADIOLOGY

## 2021-12-28 PROCEDURE — G0279 TOMOSYNTHESIS, MAMMO: HCPCS

## 2021-12-28 PROCEDURE — 76642 ULTRASOUND BREAST LIMITED: CPT

## 2021-12-28 PROCEDURE — 77062 BREAST TOMOSYNTHESIS BI: CPT | Performed by: RADIOLOGY

## 2021-12-28 PROCEDURE — 77066 DX MAMMO INCL CAD BI: CPT

## 2021-12-28 PROCEDURE — 0 LIDOCAINE 1 % SOLUTION: Performed by: RADIOLOGY

## 2021-12-28 PROCEDURE — 88305 TISSUE EXAM BY PATHOLOGIST: CPT | Performed by: RADIOLOGY

## 2021-12-28 PROCEDURE — 88342 IMHCHEM/IMCYTCHM 1ST ANTB: CPT | Performed by: RADIOLOGY

## 2021-12-28 PROCEDURE — 76642 ULTRASOUND BREAST LIMITED: CPT | Performed by: RADIOLOGY

## 2021-12-28 PROCEDURE — 77066 DX MAMMO INCL CAD BI: CPT | Performed by: RADIOLOGY

## 2021-12-28 RX ORDER — LIDOCAINE HYDROCHLORIDE 10 MG/ML
5 INJECTION, SOLUTION INFILTRATION; PERINEURAL ONCE
Status: COMPLETED | OUTPATIENT
Start: 2021-12-28 | End: 2021-12-28

## 2021-12-28 RX ORDER — LIDOCAINE HYDROCHLORIDE AND EPINEPHRINE 10; 10 MG/ML; UG/ML
10 INJECTION, SOLUTION INFILTRATION; PERINEURAL ONCE
Status: COMPLETED | OUTPATIENT
Start: 2021-12-28 | End: 2021-12-28

## 2021-12-28 RX ADMIN — LIDOCAINE HYDROCHLORIDE,EPINEPHRINE BITARTRATE 6 ML: 10; .01 INJECTION, SOLUTION INFILTRATION; PERINEURAL at 13:55

## 2021-12-28 RX ADMIN — Medication 5 ML: at 13:54

## 2021-12-28 RX ADMIN — Medication 4 ML: at 13:55

## 2021-12-30 ENCOUNTER — TELEPHONE (OUTPATIENT)
Dept: MAMMOGRAPHY | Facility: HOSPITAL | Age: 31
End: 2021-12-30

## 2021-12-30 LAB
CYTO UR: NORMAL
LAB AP CASE REPORT: NORMAL
LAB AP CLINICAL INFORMATION: NORMAL
LAB AP DIAGNOSIS COMMENT: NORMAL
LAB AP SPECIAL STAINS: NORMAL
PATH REPORT.FINAL DX SPEC: NORMAL
PATH REPORT.GROSS SPEC: NORMAL

## 2021-12-30 NOTE — TELEPHONE ENCOUNTER
Attempted to notify patient of results and recommendation.  Unable to leave message.  No answer/patient's voice mailbox full.

## 2022-01-03 ENCOUNTER — TELEPHONE (OUTPATIENT)
Dept: MAMMOGRAPHY | Facility: HOSPITAL | Age: 32
End: 2022-01-03

## 2022-01-03 NOTE — TELEPHONE ENCOUNTER
Attempted to notify patient of results and recommendation.  Left message for patient to return my call.

## 2022-01-03 NOTE — TELEPHONE ENCOUNTER
Pt notified of pathology results and recommendations. Verbalizes understanding. Denies discomfort. Denies signs and symptoms of infection.     Patient previously requested Dr KAMILLA Holm for surgical consult. Pt notified of surgical consult appointment on 1.27.22 @ [id number] . Pt given office contact & location information. Told to bring photo ID, list of prescription & OTC medications, insurance information, must wear a mask. Encouraged pt to call back or contact surgeon's office with further questions. Pt verbalized understanding.

## 2022-01-04 ENCOUNTER — HOSPITAL ENCOUNTER (OUTPATIENT)
Dept: MRI IMAGING | Facility: HOSPITAL | Age: 32
Discharge: HOME | End: 2022-01-04
Admitting: ANESTHESIOLOGY

## 2022-01-04 DIAGNOSIS — M54.16 LUMBAR RADICULOPATHY: ICD-10-CM

## 2022-01-04 PROCEDURE — 72148 MRI LUMBAR SPINE W/O DYE: CPT

## 2022-01-05 ENCOUNTER — OUTSIDE FACILITY SERVICE (OUTPATIENT)
Dept: GASTROENTEROLOGY | Facility: CLINIC | Age: 32
End: 2022-01-05

## 2022-01-05 PROCEDURE — 45378 DIAGNOSTIC COLONOSCOPY: CPT | Performed by: INTERNAL MEDICINE

## 2022-02-09 ENCOUNTER — TELEPHONE (OUTPATIENT)
Dept: NEUROLOGY | Facility: CLINIC | Age: 32
End: 2022-02-09

## 2022-02-09 DIAGNOSIS — R11.0 CHRONIC NAUSEA: ICD-10-CM

## 2022-02-09 RX ORDER — ONDANSETRON 4 MG/1
TABLET, ORALLY DISINTEGRATING ORAL
Qty: 120 TABLET | Refills: 1 | Status: SHIPPED | OUTPATIENT
Start: 2022-02-09 | End: 2022-05-16

## 2022-02-09 NOTE — TELEPHONE ENCOUNTER
Rx Refill Note  Requested Prescriptions     Pending Prescriptions Disp Refills   • ondansetron ODT (ZOFRAN-ODT) 4 MG disintegrating tablet [Pharmacy Med Name: ONDANSETRON ODT 4 MG TABLET] 120 tablet 3     Sig: DISSOLVE 1 TABLET ON THE TONGUE EVERY 6 HOURS.      Last office visit with prescribing clinician: 11/9/2021      Next office visit with prescribing clinician: Visit date not found     Griselda Brown MA  02/09/22, 13:19 EST     Last fill: 09/18/2021

## 2022-04-12 ENCOUNTER — TELEPHONE (OUTPATIENT)
Dept: FAMILY MEDICINE CLINIC | Facility: CLINIC | Age: 32
End: 2022-04-12

## 2022-05-03 ENCOUNTER — OFFICE VISIT (OUTPATIENT)
Dept: FAMILY MEDICINE CLINIC | Facility: CLINIC | Age: 32
End: 2022-05-03

## 2022-05-03 VITALS
OXYGEN SATURATION: 97 % | WEIGHT: 110.2 LBS | HEART RATE: 63 BPM | SYSTOLIC BLOOD PRESSURE: 110 MMHG | HEIGHT: 65 IN | BODY MASS INDEX: 18.36 KG/M2 | DIASTOLIC BLOOD PRESSURE: 68 MMHG

## 2022-05-03 DIAGNOSIS — H53.9 VISION CHANGES: Primary | ICD-10-CM

## 2022-05-03 DIAGNOSIS — S09.90XD CLOSED HEAD INJURY, SUBSEQUENT ENCOUNTER: ICD-10-CM

## 2022-05-03 PROCEDURE — 99213 OFFICE O/P EST LOW 20 MIN: CPT | Performed by: FAMILY MEDICINE
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
## 2022-05-16 DIAGNOSIS — R11.0 CHRONIC NAUSEA: ICD-10-CM

## 2022-05-16 RX ORDER — ONDANSETRON 4 MG/1
TABLET, ORALLY DISINTEGRATING ORAL
Qty: 120 TABLET | Refills: 1 | Status: SHIPPED | OUTPATIENT
Start: 2022-05-16 | End: 2022-09-20

## 2022-05-16 NOTE — TELEPHONE ENCOUNTER
Rx Refill Note  Requested Prescriptions     Pending Prescriptions Disp Refills   • ondansetron ODT (ZOFRAN-ODT) 4 MG disintegrating tablet [Pharmacy Med Name: ONDANSETRON ODT 4 MG TABLET] 120 tablet 1     Sig: DISSOLVE 1 TABLET ON THE TONGUE EVERY 6 HOURS.      Last office visit with prescribing clinician: 5/3/2022      Next office visit with prescribing clinician: Visit date not found            Santiago Ricci MA  05/16/22, 15:52 EDT

## 2022-05-23 ENCOUNTER — TELEPHONE (OUTPATIENT)
Dept: NEUROLOGY | Facility: CLINIC | Age: 32
End: 2022-05-23
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
## 2022-05-23 NOTE — TELEPHONE ENCOUNTER
Provider: PURNIMA DE LEON   Caller: LAWRENCE   Relationship to Patient: PT   Phone Number: [phone]  Reason for Call: PT SEEN OPTHALMOLOGY, WAS UNABLE TO GET IN @ [facility] BUT DID SEE MEDICAL VISION NOTE IS IN CHART IN MEDIA. PT SCHEDULED FOR FIRST AVAILABLE F/U.

## 2022-07-06 ENCOUNTER — PATIENT MESSAGE (OUTPATIENT)
Dept: FAMILY MEDICINE CLINIC | Facility: CLINIC | Age: 32
End: 2022-07-06

## 2022-07-13 ENCOUNTER — TELEPHONE (OUTPATIENT)
Dept: FAMILY MEDICINE CLINIC | Facility: CLINIC | Age: 32
End: 2022-07-13

## 2022-07-18 ENCOUNTER — TELEMEDICINE (OUTPATIENT)
Dept: FAMILY MEDICINE CLINIC | Facility: CLINIC | Age: 32
End: 2022-07-18

## 2022-07-18 DIAGNOSIS — N76.4 LABIAL ABSCESS: ICD-10-CM

## 2022-07-18 DIAGNOSIS — M41.9 SCOLIOSIS, UNSPECIFIED SCOLIOSIS TYPE, UNSPECIFIED SPINAL REGION: Primary | ICD-10-CM

## 2022-07-18 PROCEDURE — 99213 OFFICE O/P EST LOW 20 MIN: CPT | Performed by: FAMILY MEDICINE

## 2022-07-18 NOTE — PROGRESS NOTES
Chief Complaint  No chief complaint on file.    Subjective         Josephine Taylor presents to [facility]  History of Present Illness     She would like referral for scoliosis. She went to PT several times and has also done home exercises. She has seen surgeon in the past and discussed a surgical option. She has worse back pain. She was referred to pain doctor and had tests done showing 3 hot spots. One in her leg and two in her lower back. Her back is overarched and bones of pelvis and lower spine have fused together.     She went to  in [place] and had shot for gonorrhea. She has follow-up appt with OB/GYN, drain fell out 2 days ago. She is also on her menses. Last couple days skin was warm to touch and face hot, unsure if it was a fever. She has a few Augmentin pills left. Swelling went down but there is still a knot there. She has done sitz baths.       Objective   Vital Signs:   There were no vitals taken for this visit.    Virtual Visit Physical Exam  Result Review :                MRI Lumbar Spine Without Contrast (01/04/2022 09:51)    Neisseria gonorrhea DNA by PCR (07/09/2022 11:04)  HIV-1/O/2 ANTIGEN/ANTIBODY, 4TH GENERATION (07/09/2022 10:42)  BASIC METABOLIC PANEL (07/09/2022 10:42)  CBC AND DIFFERENTIAL (07/09/2022 10:42)  Hepatitis C Antibody - ED (07/09/2022 10:42)  Chlamydia trachomatis by PCR (07/09/2022 11:04)  Urinalysis With Microscopic If Indicated (No Culture) - (07/09/2022 11:53)   ED note 7/9/22        Assessment and Plan    Diagnoses and all orders for this visit:    1. Scoliosis, unspecified scoliosis type, unspecified spinal region (Primary)  -     Ambulatory Referral to Neurosurgery  She is seeking a second opinion for treatment options. She is followed by pain management clinic.   2. Labial abscess  Improving. Keep appt with OB/GYN for wound check in 3 days. Continue sitz baths. Finish anitbiotics. If fever 101, purulent drainage, or increased  pain seek medical care.       Follow Up   No follow-ups on file.  Patient was given instructions and counseling regarding her condition or for health maintenance advice. Please see specific information pulled into the AVS if appropriate.     Mode of Visit: Video  Location of patient: home  You have chosen to receive care through a telehealth visit.  The patient has signed the video visit consent form.  The visit included audio and video interaction. No technical issues occurred during this visit.     Electronically signed by Faye Zavaleta MD, 07/18/22, 2:32 PM EDT.

## 2022-07-29 ENCOUNTER — OFFICE VISIT (OUTPATIENT)
Dept: NEUROLOGY | Facility: CLINIC | Age: 32
End: 2022-07-29

## 2022-07-29 VITALS
WEIGHT: 113 LBS | OXYGEN SATURATION: 96 % | BODY MASS INDEX: 18.83 KG/M2 | HEIGHT: 65 IN | SYSTOLIC BLOOD PRESSURE: 120 MMHG | HEART RATE: 71 BPM | DIASTOLIC BLOOD PRESSURE: 60 MMHG

## 2022-07-29 DIAGNOSIS — H53.8 BLURRED VISION, BILATERAL: ICD-10-CM

## 2022-07-29 DIAGNOSIS — G43.719 CHRONIC MIGRAINE WITHOUT AURA, INTRACTABLE, WITHOUT STATUS MIGRAINOSUS: ICD-10-CM

## 2022-07-29 DIAGNOSIS — G44.321 INTRACTABLE CHRONIC POST-TRAUMATIC HEADACHE: ICD-10-CM

## 2022-07-29 DIAGNOSIS — H21.561 PUPILLARY ABNORMALITY, RIGHT: Primary | ICD-10-CM

## 2022-07-29 PROCEDURE — 99214 OFFICE O/P EST MOD 30 MIN: CPT | Performed by: NURSE PRACTITIONER

## 2022-07-29 RX ORDER — RIMEGEPANT SULFATE 75 MG/75MG
75 TABLET, ORALLY DISINTEGRATING ORAL EVERY OTHER DAY
Qty: 16 TABLET | Refills: 5 | Status: SHIPPED | OUTPATIENT
Start: 2022-07-29
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
## 2022-08-01 ENCOUNTER — PRIOR AUTHORIZATION (OUTPATIENT)
Dept: NEUROLOGY | Facility: CLINIC | Age: 32
End: 2022-08-01

## 2022-09-06 ENCOUNTER — APPOINTMENT (OUTPATIENT)
Dept: MRI IMAGING | Facility: HOSPITAL | Age: 32
End: 2022-09-06

## 2022-09-06 ENCOUNTER — HOSPITAL ENCOUNTER (OUTPATIENT)
Dept: MRI IMAGING | Facility: HOSPITAL | Age: 32
End: 2022-09-06

## 2022-09-11 ENCOUNTER — HOSPITAL ENCOUNTER (OUTPATIENT)
Dept: MRI IMAGING | Facility: HOSPITAL | Age: 32
Discharge: HOME | End: 2022-09-11

## 2022-09-11 DIAGNOSIS — H21.561 PUPILLARY ABNORMALITY, RIGHT: ICD-10-CM

## 2022-09-11 DIAGNOSIS — G44.321 INTRACTABLE CHRONIC POST-TRAUMATIC HEADACHE: ICD-10-CM

## 2022-09-11 DIAGNOSIS — H53.8 BLURRED VISION, BILATERAL: ICD-10-CM

## 2022-09-11 PROCEDURE — 70553 MRI BRAIN STEM W/O & W/DYE: CPT

## 2022-09-11 PROCEDURE — 0 GADOBENATE DIMEGLUMINE 529 MG/ML SOLUTION: Performed by: NURSE PRACTITIONER

## 2022-09-11 PROCEDURE — 70545 MR ANGIOGRAPHY HEAD W/DYE: CPT

## 2022-09-11 PROCEDURE — A9577 INJ MULTIHANCE: HCPCS | Performed by: NURSE PRACTITIONER

## 2022-09-11 RX ADMIN — GADOBENATE DIMEGLUMINE 10 ML: 529 INJECTION, SOLUTION INTRAVENOUS at 14:03

## 2022-09-12 ENCOUNTER — TELEPHONE (OUTPATIENT)
Dept: NEUROLOGY | Facility: CLINIC | Age: 32
End: 2022-09-12

## 2022-09-12 NOTE — TELEPHONE ENCOUNTER
----- Message from PEDRITO Kelly sent at 9/12/2022 10:50 AM EDT -----  Please notify Josephine that the MRI of her brain with and without contrast along with the MRA of her brain are both completely normal-these looked at both the structure as well as the arterial blood flow throughout the brain. I have reviewed both of her images in detail.  I agree with the radiologist.  We will follow-up as scheduled in clinic.  Thanks, PEDRITO Newton.

## 2022-09-12 NOTE — PROGRESS NOTES
Please notify Josephine that the MRI of her brain with and without contrast along with the MRA of her brain are both completely normal-these looked at both the structure as well as the arterial blood flow throughout the brain. I have reviewed both of her images in detail.  I agree with the radiologist.  We will follow-up as scheduled in clinic.  Thanks, PEDRITO Newton.

## 2022-09-19 DIAGNOSIS — R11.0 CHRONIC NAUSEA: ICD-10-CM

## 2022-09-20 RX ORDER — ONDANSETRON 4 MG/1
TABLET, ORALLY DISINTEGRATING ORAL
Qty: 120 TABLET | Refills: 0 | Status: SHIPPED | OUTPATIENT
Start: 2022-09-20 | End: 2023-01-09 | Stop reason: SDUPTHER

## 2022-09-20 NOTE — TELEPHONE ENCOUNTER
Rx Refill Note  Requested Prescriptions     Pending Prescriptions Disp Refills   • ondansetron ODT (ZOFRAN-ODT) 4 MG disintegrating tablet [Pharmacy Med Name: ONDANSETRON ODT 4 MG TABLET] 120 tablet 1     Sig: DISSOLVE 1 TABLET ON THE TONGUE EVERY 6 HOURS.      Last office visit with prescribing clinician: 5/3/2022      Next office visit with prescribing clinician: Visit date not found            Jimmy Arredondo MA  09/20/22, 08:19 EDT

## 2022-09-30 ENCOUNTER — PATIENT MESSAGE (OUTPATIENT)
Dept: FAMILY MEDICINE CLINIC | Facility: CLINIC | Age: 32
End: 2022-09-30

## 2022-09-30 DIAGNOSIS — M41.9 SCOLIOSIS, UNSPECIFIED SCOLIOSIS TYPE, UNSPECIFIED SPINAL REGION: Primary | ICD-10-CM

## 2022-10-04 ENCOUNTER — HOSPITAL ENCOUNTER (EMERGENCY)
Facility: HOSPITAL | Age: 32
Discharge: HOME | End: 2022-10-05
Attending: EMERGENCY MEDICINE | Admitting: EMERGENCY MEDICINE

## 2022-10-04 ENCOUNTER — APPOINTMENT (OUTPATIENT)
Dept: CT IMAGING | Facility: HOSPITAL | Age: 32
End: 2022-10-04

## 2022-10-04 DIAGNOSIS — N94.89 PELVIC CONGESTION SYNDROME: ICD-10-CM

## 2022-10-04 DIAGNOSIS — M54.50 ACUTE BILATERAL LOW BACK PAIN WITHOUT SCIATICA: Primary | ICD-10-CM

## 2022-10-04 DIAGNOSIS — N39.0 ACUTE UTI: ICD-10-CM

## 2022-10-04 DIAGNOSIS — R11.2 NAUSEA AND VOMITING, UNSPECIFIED VOMITING TYPE: ICD-10-CM

## 2022-10-04 DIAGNOSIS — Z87.39 HISTORY OF FIBROMYALGIA: ICD-10-CM

## 2022-10-04 DIAGNOSIS — Z87.898 HISTORY OF CHRONIC PAIN: ICD-10-CM

## 2022-10-04 DIAGNOSIS — Z86.59 HISTORY OF POSTTRAUMATIC STRESS DISORDER (PTSD): ICD-10-CM

## 2022-10-04 DIAGNOSIS — Z98.890 HISTORY OF VASCULAR SURGERY: ICD-10-CM

## 2022-10-04 LAB
ALBUMIN SERPL-MCNC: 4.1 G/DL (ref 3.5–5.2)
ALBUMIN/GLOB SERPL: 1.6 G/DL
ALP SERPL-CCNC: 74 U/L (ref 39–117)
ALT SERPL W P-5'-P-CCNC: 7 U/L (ref 1–33)
ANION GAP SERPL CALCULATED.3IONS-SCNC: 12 MMOL/L (ref 5–15)
AST SERPL-CCNC: 15 U/L (ref 1–32)
BASOPHILS # BLD AUTO: 0.04 10*3/MM3 (ref 0–0.2)
BASOPHILS NFR BLD AUTO: 0.7 % (ref 0–1.5)
BILIRUB SERPL-MCNC: 0.5 MG/DL (ref 0–1.2)
BUN SERPL-MCNC: 9 MG/DL (ref 6–20)
BUN/CREAT SERPL: 15 (ref 7–25)
CALCIUM SPEC-SCNC: 8.8 MG/DL (ref 8.6–10.5)
CHLORIDE SERPL-SCNC: 108 MMOL/L (ref 98–107)
CK SERPL-CCNC: 74 U/L (ref 20–180)
CO2 SERPL-SCNC: 20 MMOL/L (ref 22–29)
CREAT SERPL-MCNC: 0.6 MG/DL (ref 0.57–1)
DEPRECATED RDW RBC AUTO: 41.4 FL (ref 37–54)
EGFRCR SERPLBLD CKD-EPI 2021: 123.2 ML/MIN/1.73
EOSINOPHIL # BLD AUTO: 0.16 10*3/MM3 (ref 0–0.4)
EOSINOPHIL NFR BLD AUTO: 2.6 % (ref 0.3–6.2)
ERYTHROCYTE [DISTWIDTH] IN BLOOD BY AUTOMATED COUNT: 11.8 % (ref 12.3–15.4)
GLOBULIN UR ELPH-MCNC: 2.5 GM/DL
GLUCOSE SERPL-MCNC: 82 MG/DL (ref 65–99)
HCG INTACT+B SERPL-ACNC: <0.1 MIU/ML
HCT VFR BLD AUTO: 37.9 % (ref 34–46.6)
HGB BLD-MCNC: 13 G/DL (ref 12–15.9)
HOLD SPECIMEN: NORMAL
HOLD SPECIMEN: NORMAL
IMM GRANULOCYTES # BLD AUTO: 0.01 10*3/MM3 (ref 0–0.05)
IMM GRANULOCYTES NFR BLD AUTO: 0.2 % (ref 0–0.5)
LYMPHOCYTES # BLD AUTO: 1.86 10*3/MM3 (ref 0.7–3.1)
LYMPHOCYTES NFR BLD AUTO: 30.4 % (ref 19.6–45.3)
MCH RBC QN AUTO: 32.8 PG (ref 26.6–33)
MCHC RBC AUTO-ENTMCNC: 34.3 G/DL (ref 31.5–35.7)
MCV RBC AUTO: 95.7 FL (ref 79–97)
MONOCYTES # BLD AUTO: 0.33 10*3/MM3 (ref 0.1–0.9)
MONOCYTES NFR BLD AUTO: 5.4 % (ref 5–12)
NEUTROPHILS NFR BLD AUTO: 3.71 10*3/MM3 (ref 1.7–7)
NEUTROPHILS NFR BLD AUTO: 60.7 % (ref 42.7–76)
NRBC BLD AUTO-RTO: 0 /100 WBC (ref 0–0.2)
PLATELET # BLD AUTO: 217 10*3/MM3 (ref 140–450)
PMV BLD AUTO: 11.2 FL (ref 6–12)
POTASSIUM SERPL-SCNC: 3.9 MMOL/L (ref 3.5–5.2)
PROT SERPL-MCNC: 6.6 G/DL (ref 6–8.5)
RBC # BLD AUTO: 3.96 10*6/MM3 (ref 3.77–5.28)
SODIUM SERPL-SCNC: 140 MMOL/L (ref 136–145)
WBC NRBC COR # BLD: 6.11 10*3/MM3 (ref 3.4–10.8)
WHOLE BLOOD HOLD COAG: NORMAL
WHOLE BLOOD HOLD SPECIMEN: NORMAL

## 2022-10-04 PROCEDURE — 73701 CT LOWER EXTREMITY W/DYE: CPT

## 2022-10-04 PROCEDURE — 80053 COMPREHEN METABOLIC PANEL: CPT | Performed by: EMERGENCY MEDICINE

## 2022-10-04 PROCEDURE — 83605 ASSAY OF LACTIC ACID: CPT | Performed by: PHYSICIAN ASSISTANT

## 2022-10-04 PROCEDURE — 85025 COMPLETE CBC W/AUTO DIFF WBC: CPT | Performed by: EMERGENCY MEDICINE

## 2022-10-04 PROCEDURE — 84702 CHORIONIC GONADOTROPIN TEST: CPT | Performed by: EMERGENCY MEDICINE

## 2022-10-04 PROCEDURE — 74177 CT ABD & PELVIS W/CONTRAST: CPT

## 2022-10-04 PROCEDURE — 25010000002 IOPAMIDOL 61 % SOLUTION: Performed by: EMERGENCY MEDICINE

## 2022-10-04 PROCEDURE — 25010000002 MORPHINE PER 10 MG: Performed by: EMERGENCY MEDICINE

## 2022-10-04 PROCEDURE — 99284 EMERGENCY DEPT VISIT MOD MDM: CPT

## 2022-10-04 PROCEDURE — 25010000002 ONDANSETRON PER 1 MG: Performed by: EMERGENCY MEDICINE

## 2022-10-04 PROCEDURE — 96374 THER/PROPH/DIAG INJ IV PUSH: CPT

## 2022-10-04 PROCEDURE — 82550 ASSAY OF CK (CPK): CPT | Performed by: EMERGENCY MEDICINE

## 2022-10-04 PROCEDURE — 96375 TX/PRO/DX INJ NEW DRUG ADDON: CPT

## 2022-10-04 RX ORDER — MORPHINE SULFATE 4 MG/ML
4 INJECTION, SOLUTION INTRAMUSCULAR; INTRAVENOUS ONCE
Status: COMPLETED | OUTPATIENT
Start: 2022-10-04 | End: 2022-10-04

## 2022-10-04 RX ORDER — SODIUM CHLORIDE 0.9 % (FLUSH) 0.9 %
10 SYRINGE (ML) INJECTION AS NEEDED
Status: DISCONTINUED | OUTPATIENT
Start: 2022-10-04 | End: 2022-10-05 | Stop reason: HOSPADM

## 2022-10-04 RX ORDER — ONDANSETRON 2 MG/ML
4 INJECTION INTRAMUSCULAR; INTRAVENOUS ONCE
Status: COMPLETED | OUTPATIENT
Start: 2022-10-04 | End: 2022-10-04

## 2022-10-04 RX ADMIN — IOPAMIDOL 100 ML: 612 INJECTION, SOLUTION INTRAVENOUS at 23:00

## 2022-10-04 RX ADMIN — SODIUM CHLORIDE 1000 ML: 9 INJECTION, SOLUTION INTRAVENOUS at 22:48

## 2022-10-04 RX ADMIN — ONDANSETRON 4 MG: 2 INJECTION INTRAMUSCULAR; INTRAVENOUS at 22:45

## 2022-10-04 RX ADMIN — MORPHINE SULFATE 4 MG: 4 INJECTION, SOLUTION INTRAMUSCULAR; INTRAVENOUS at 22:45

## 2022-10-05 VITALS
OXYGEN SATURATION: 99 % | BODY MASS INDEX: 18.33 KG/M2 | SYSTOLIC BLOOD PRESSURE: 103 MMHG | DIASTOLIC BLOOD PRESSURE: 66 MMHG | RESPIRATION RATE: 21 BRPM | TEMPERATURE: 98.2 F | HEART RATE: 51 BPM | HEIGHT: 65 IN | WEIGHT: 110 LBS

## 2022-10-05 LAB
BACTERIA UR QL AUTO: ABNORMAL /HPF
BILIRUB UR QL STRIP: ABNORMAL
CLARITY UR: ABNORMAL
COLOR UR: ABNORMAL
D-LACTATE SERPL-SCNC: 1.1 MMOL/L (ref 0.5–2)
GLUCOSE UR STRIP-MCNC: NEGATIVE MG/DL
HGB UR QL STRIP.AUTO: ABNORMAL
HOLD SPECIMEN: NORMAL
HYALINE CASTS UR QL AUTO: ABNORMAL /LPF
KETONES UR QL STRIP: NEGATIVE
LEUKOCYTE ESTERASE UR QL STRIP.AUTO: ABNORMAL
NITRITE UR QL STRIP: POSITIVE
PH UR STRIP.AUTO: <=5 [PH] (ref 5–8)
PROT UR QL STRIP: ABNORMAL
RBC # UR STRIP: ABNORMAL /HPF
REF LAB TEST METHOD: ABNORMAL
SP GR UR STRIP: 1.09 (ref 1–1.03)
SQUAMOUS #/AREA URNS HPF: ABNORMAL /HPF
UROBILINOGEN UR QL STRIP: ABNORMAL
WBC # UR STRIP: ABNORMAL /HPF

## 2022-10-05 PROCEDURE — 96376 TX/PRO/DX INJ SAME DRUG ADON: CPT

## 2022-10-05 PROCEDURE — 25010000002 ONDANSETRON PER 1 MG: Performed by: EMERGENCY MEDICINE

## 2022-10-05 PROCEDURE — 96375 TX/PRO/DX INJ NEW DRUG ADDON: CPT

## 2022-10-05 PROCEDURE — 87086 URINE CULTURE/COLONY COUNT: CPT | Performed by: PHYSICIAN ASSISTANT

## 2022-10-05 PROCEDURE — 81001 URINALYSIS AUTO W/SCOPE: CPT | Performed by: EMERGENCY MEDICINE

## 2022-10-05 PROCEDURE — 25010000002 METOCLOPRAMIDE PER 10 MG: Performed by: PHYSICIAN ASSISTANT

## 2022-10-05 RX ORDER — ONDANSETRON 4 MG/1
4 TABLET, ORALLY DISINTEGRATING ORAL EVERY 4 HOURS
Qty: 12 TABLET | Refills: 0 | Status: SHIPPED | OUTPATIENT
Start: 2022-10-05 | End: 2022-12-22 | Stop reason: SDUPTHER

## 2022-10-05 RX ORDER — ONDANSETRON 2 MG/ML
4 INJECTION INTRAMUSCULAR; INTRAVENOUS ONCE
Status: COMPLETED | OUTPATIENT
Start: 2022-10-05 | End: 2022-10-05

## 2022-10-05 RX ORDER — NITROFURANTOIN 25; 75 MG/1; MG/1
100 CAPSULE ORAL EVERY 12 HOURS SCHEDULED
Status: COMPLETED | OUTPATIENT
Start: 2022-10-05 | End: 2022-10-05

## 2022-10-05 RX ORDER — NITROFURANTOIN 25; 75 MG/1; MG/1
100 CAPSULE ORAL 2 TIMES DAILY
Qty: 14 CAPSULE | Refills: 0 | Status: SHIPPED | OUTPATIENT
Start: 2022-10-05 | End: 2022-12-22

## 2022-10-05 RX ORDER — METOCLOPRAMIDE HYDROCHLORIDE 5 MG/ML
10 INJECTION INTRAMUSCULAR; INTRAVENOUS ONCE
Status: COMPLETED | OUTPATIENT
Start: 2022-10-05 | End: 2022-10-05

## 2022-10-05 RX ORDER — FLUCONAZOLE 150 MG/1
150 TABLET ORAL ONCE
Qty: 1 TABLET | Refills: 0 | Status: SHIPPED | OUTPATIENT
Start: 2022-10-05 | End: 2022-10-05

## 2022-10-05 RX ORDER — OXYCODONE HYDROCHLORIDE AND ACETAMINOPHEN 5; 325 MG/1; MG/1
1 TABLET ORAL ONCE
Status: COMPLETED | OUTPATIENT
Start: 2022-10-05 | End: 2022-10-05

## 2022-10-05 RX ADMIN — ONDANSETRON 4 MG: 2 INJECTION INTRAMUSCULAR; INTRAVENOUS at 03:08

## 2022-10-05 RX ADMIN — METOCLOPRAMIDE 10 MG: 5 INJECTION, SOLUTION INTRAMUSCULAR; INTRAVENOUS at 02:52

## 2022-10-05 RX ADMIN — OXYCODONE HYDROCHLORIDE AND ACETAMINOPHEN 1 TABLET: 5; 325 TABLET ORAL at 03:08

## 2022-10-05 RX ADMIN — NITROFURANTOIN (MONOHYDRATE/MACROCRYSTALS) 100 MG: 75; 25 CAPSULE ORAL at 03:08

## 2022-10-05 NOTE — ED PROVIDER NOTES
"Subjective   History of Present Illness  This is a 31-year-old female that presents the ER with pain all across the lower back that radiates to the left groin x24 hours.  Patient has history of pelvic congestion syndrome.  She had a \"Juvenal\" placed approximately 10 years ago at .  Patient was having increased pain and has been followed by chronic pain management, Dr. Luna and is treated with injections to the back.  She was referred to CT vascular physician, Dr. Strong, and underwent surgery yesterday at an outpatient facility and sent home.  Patient says that she had a stent placed to the left iliac vein.  Shortly after surgery, patient started having some low back discomfort, but she thought that that was normal.  Pain intensified.  It radiates to the left groin.  Incision to the left groin looks good.  There is no wound dehiscence, swelling, or erythema.  Patient also reports some tingling on the toes of the left foot but no other pain to the left lower extremity.  Patient denies fever or chills.  She does report nausea/vomiting secondary to pain and threw up 3 times.  Last bowel movement was 2 days ago.  Patient denies dysuria, urgency, or frequency.  Last menstrual period started yesterday.  Past medical history is significant for pelvic congestion syndrome, osteoarthritis, history of migraines, depression, history of traumatic brain injury, scoliosis, PTSD, postconcussion syndrome, panic disorder, fibromyalgia, and insomnia.    History provided by:  Patient  Back Pain  Location:  Lumbar spine (All across low back )  Radiates to: Left groin.  Duration:  24 hours  Timing:  Constant  Progression:  Unchanged  Chronicity:  New  Context comment:  Pt has h/o pelvic congestion syndrome.  Had \"coil\" placed 10 years ago.  Recently saw Dr. Strong; vascular surgeon.  He placed stent to left iliac \"vein\" patient says.  Surgery yesterday and sent home.  Severe pain all across lower back; sharp, constant.   Relieved by:  " Nothing  Worsened by:  Movement, ambulation and standing  Ineffective treatments: Dr. Strong called in Tramadol without relief.  Associated symptoms: leg pain (left groin.) and tingling (tingling to toes on left foot.)    Associated symptoms: no abdominal pain, no abdominal swelling, no bladder incontinence, no bowel incontinence, no chest pain, no dysuria, no fever, no headaches, no pelvic pain and no weakness        Review of Systems   Constitutional: Positive for appetite change. Negative for chills, diaphoresis, fatigue and fever.   HENT: Negative.    Respiratory: Negative.  Negative for cough and shortness of breath.         Positive tobacco abuse.     Cardiovascular: Negative.  Negative for chest pain, palpitations and leg swelling.        Patient had vascular surgery yesterday by Dr. Strong and had stent placed to the left iliac vein.   Gastrointestinal: Positive for nausea and vomiting (Emesis x 3). Negative for abdominal pain, bowel incontinence, constipation (Last BM 2 days ago.) and diarrhea.   Genitourinary: Negative.  Negative for bladder incontinence, dysuria, enuresis, flank pain, frequency, pelvic pain and urgency.        LMP: yesterday. No urinary incontinence.   Musculoskeletal: Positive for back pain (pain all across low back with radiation to left groin.).        History of scoliosis and chronic low back pain.  Pt follows with Dr. Luna, pain management.  History of fibromyalgia.   Skin: Positive for wound (Incision looks good from recent surgery.).   Neurological: Positive for tingling (tingling to toes on left foot.). Negative for dizziness, syncope, weakness, light-headedness and headaches.        Tingling to left toes.   All other systems reviewed and are negative.      Past Medical History:   Diagnosis Date   • Anxiety    • Arthritis    • Chronic abdominal pain    • Chronic nausea    • Colitis    • CTS (carpal tunnel syndrome)    • CYP2D6 poor metabolizer (HCC)    • Depression    • Difficulty  walking    • Fibromyalgia, primary    • Genitofemoral neuropathy     left   • Insomnia    • Kidney infection    • Memory loss    • Migraines    • Panic disorder    • Postconcussion syndrome    • PTSD (post-traumatic stress disorder)    • Scoliosis    • Traumatic brain injury (HCC) 2019   • Vitamin D deficiency        Allergies   Allergen Reactions   • Galcanezumab-Gnlm Shortness Of Breath   • Aimovig [Erenumab-Aooe] Other (See Comments)     Told to avoid due to hives, SOA with Emgality   • Sulfa Antibiotics Hives   • Venlafaxine Other (See Comments)     Worsened headaches and severe mood changes   • Ciprofloxacin Unknown - High Severity       Past Surgical History:   Procedure Laterality Date   • ABDOMINAL SURGERY      coil renal?    • BREAST BIOPSY Left     bx and excisional   • BREAST LUMPECTOMY     •  SECTION     • VASCULAR SURGERY  [id number]    Embolism       Family History   Problem Relation Age of Onset   • Arthritis Mother    • Mental illness Mother    • Heart attack Father    • Mental illness Father    • Migraines Sister    • Melanoma Maternal Grandmother    • Brain cancer Maternal Grandfather    • Colon cancer Neg Hx    • Colon polyps Neg Hx    • Esophageal cancer Neg Hx        Social History     Socioeconomic History   • Marital status: Legally    Tobacco Use   • Smoking status: Current Every Day Smoker     Packs/day: 1.00     Years: 15.00     Pack years: 15.00     Types: Cigarettes     Start date: 2005   • Smokeless tobacco: Never Used   Vaping Use   • Vaping Use: Never used   Substance and Sexual Activity   • Alcohol use: Never   • Drug use: Yes     Types: Marijuana     Comment: Pt states that she smokes about a few times a week.   • Sexual activity: Yes     Partners: Male     Birth control/protection: None           Objective   Physical Exam  Vitals and nursing note reviewed.   Constitutional:       General: She is not in acute distress.     Appearance: Normal appearance. She is  not ill-appearing or diaphoretic.      Comments: Thin build.  Patient appears uncomfortable secondary to low back pain.  Nontoxic.   HENT:      Head: Normocephalic and atraumatic.      Right Ear: Tympanic membrane normal.      Left Ear: Tympanic membrane normal.      Nose: Nose normal.      Mouth/Throat:      Mouth: Mucous membranes are moist.      Pharynx: Oropharynx is clear.   Eyes:      Extraocular Movements: Extraocular movements intact.      Conjunctiva/sclera: Conjunctivae normal.      Pupils: Pupils are equal, round, and reactive to light.   Cardiovascular:      Rate and Rhythm: Regular rhythm. Bradycardia present.  No extrasystoles are present.     Pulses: Normal pulses.           Dorsalis pedis pulses are 2+ on the right side and 2+ on the left side.        Posterior tibial pulses are 2+ on the right side and 2+ on the left side.      Heart sounds: Normal heart sounds.      Comments: Bradycardia.  No ectopy.  No pedal edema to lower extremities.  Feet are warm to touch and positive bilateral DP and PT pulses and brisk capillary refill.  No erythema or swelling to the left lower extremity.  Surgical incision to the left groin looks good.  No wound dehiscence, swelling, or erythema.  Pulmonary:      Effort: Pulmonary effort is normal.      Breath sounds: Normal breath sounds.      Comments: Lungs are clear to auscultation bilaterally  Abdominal:      General: Bowel sounds are normal. There is no distension.      Palpations: Abdomen is soft.      Tenderness: There is abdominal tenderness in the suprapubic area and left lower quadrant.      Comments: Mild tenderness to mid lower abdomen and left lower quadrant.  No rebound or guarding.  No flank or CVA tenderness.   Musculoskeletal:         General: Normal range of motion.      Cervical back: Normal range of motion and neck supple.      Right lower leg: No edema.      Left lower leg: No edema.      Comments: Tenderness to bilateral lumbar myofascia.  No  particular LS spinal tenderness.  No CVA tenderness.  No pelvic or hip tenderness.  Painful ambulation.   Skin:     General: Skin is warm and dry.      Findings: No bruising, ecchymosis or erythema.      Comments: No bruising, swelling, or erythema to the left groin.  Surgical incision looks good.  No wound dehiscence.   Neurological:      General: No focal deficit present.      Mental Status: She is alert.   Psychiatric:         Mood and Affect: Affect is tearful.         Speech: Speech normal.         Behavior: Behavior normal. Behavior is cooperative.         Thought Content: Thought content normal.         Cognition and Memory: Cognition normal.      Comments: Tearful affect.         Procedures           ED Course  ED Course as of 10/05/22 0341   Wed Oct 05, 2022   0330 CBC and chemistries were within normal limits.  Quant hCG is negative.  Creatinine kinase is 74.  Urinalysis reveals 1+ leukocytes, positive nitrite, too numerous to count red blood cells and too numerous to count white blood cells.  Lactic acid is 1.1.  Urine culture is in process.  CT the abdomen/pelvis with contrast reveals left iliac vein stent without evidence of complication.  CT of the left lower extremity with contrast revealed no acute abnormalities.  Patient given IV fluid bolus, morphine, and subsequent Percocet 5 mg by mouth.  Discussed the case with Dr. Murillo, ER attending physician.  We will treat UTI with Macrobid 100 mg by mouth twice daily x7 days and await urine culture results.  I paged on-call vascular surgeon for Dr. Strong and discussed the case with Dr. Moe.  He said that some of discomfort could be related to the fact that patient has had compression of the left iliac vein for quite some time and now that blood flow is patent, this could be causing some increased discomfort.  He said that he would be happy to see patient today in the office if Dr. Strong cannot get her in.  I updated patient on plan of treatment and she is  very appreciative.  We will send prescription for Macrobid to pharmacy and patient can continue with tramadol as directed for pain until close follow-up with CT later today. [FC]      ED Course User Index  [FC] Tania Gómez PA-C                 Recent Results (from the past 24 hour(s))   Green Top (Gel)    Collection Time: 10/04/22 10:04 PM   Result Value Ref Range    Extra Tube Hold for add-ons.    Lavender Top    Collection Time: 10/04/22 10:04 PM   Result Value Ref Range    Extra Tube hold for add-on    Gold Top - SST    Collection Time: 10/04/22 10:04 PM   Result Value Ref Range    Extra Tube Hold for add-ons.    Gray Top    Collection Time: 10/04/22 10:04 PM   Result Value Ref Range    Extra Tube Hold for add-ons.    Light Blue Top    Collection Time: 10/04/22 10:04 PM   Result Value Ref Range    Extra Tube Hold for add-ons.    Comprehensive Metabolic Panel    Collection Time: 10/04/22 10:04 PM    Specimen: Blood   Result Value Ref Range    Glucose 82 65 - 99 mg/dL    BUN 9 6 - 20 mg/dL    Creatinine 0.60 0.57 - 1.00 mg/dL    Sodium 140 136 - 145 mmol/L    Potassium 3.9 3.5 - 5.2 mmol/L    Chloride 108 (H) 98 - 107 mmol/L    CO2 20.0 (L) 22.0 - 29.0 mmol/L    Calcium 8.8 8.6 - 10.5 mg/dL    Total Protein 6.6 6.0 - 8.5 g/dL    Albumin 4.10 3.50 - 5.20 g/dL    ALT (SGPT) 7 1 - 33 U/L    AST (SGOT) 15 1 - 32 U/L    Alkaline Phosphatase 74 39 - 117 U/L    Total Bilirubin 0.5 0.0 - 1.2 mg/dL    Globulin 2.5 gm/dL    A/G Ratio 1.6 g/dL    BUN/Creatinine Ratio 15.0 7.0 - 25.0    Anion Gap 12.0 5.0 - 15.0 mmol/L    eGFR 123.2 >60.0 mL/min/1.73   CK    Collection Time: 10/04/22 10:04 PM    Specimen: Blood   Result Value Ref Range    Creatine Kinase 74 20 - 180 U/L   hCG, Quantitative, Pregnancy    Collection Time: 10/04/22 10:04 PM    Specimen: Blood   Result Value Ref Range    HCG Quantitative <0.10 mIU/mL   CBC Auto Differential    Collection Time: 10/04/22 10:04 PM    Specimen: Blood   Result Value Ref Range     WBC 6.11 3.40 - 10.80 10*3/mm3    RBC 3.96 3.77 - 5.28 10*6/mm3    Hemoglobin 13.0 12.0 - 15.9 g/dL    Hematocrit 37.9 34.0 - 46.6 %    MCV 95.7 79.0 - 97.0 fL    MCH 32.8 26.6 - 33.0 pg    MCHC 34.3 31.5 - 35.7 g/dL    RDW 11.8 (L) 12.3 - 15.4 %    RDW-SD 41.4 37.0 - 54.0 fl    MPV 11.2 6.0 - 12.0 fL    Platelets 217 140 - 450 10*3/mm3    Neutrophil % 60.7 42.7 - 76.0 %    Lymphocyte % 30.4 19.6 - 45.3 %    Monocyte % 5.4 5.0 - 12.0 %    Eosinophil % 2.6 0.3 - 6.2 %    Basophil % 0.7 0.0 - 1.5 %    Immature Grans % 0.2 0.0 - 0.5 %    Neutrophils, Absolute 3.71 1.70 - 7.00 10*3/mm3    Lymphocytes, Absolute 1.86 0.70 - 3.10 10*3/mm3    Monocytes, Absolute 0.33 0.10 - 0.90 10*3/mm3    Eosinophils, Absolute 0.16 0.00 - 0.40 10*3/mm3    Basophils, Absolute 0.04 0.00 - 0.20 10*3/mm3    Immature Grans, Absolute 0.01 0.00 - 0.05 10*3/mm3    nRBC 0.0 0.0 - 0.2 /100 WBC   Lactic Acid, Plasma    Collection Time: 10/04/22 10:04 PM    Specimen: Blood   Result Value Ref Range    Lactate 1.1 0.5 - 2.0 mmol/L   Urinalysis With Microscopic If Indicated (No Culture) - Urine, Clean Catch    Collection Time: 10/05/22  1:03 AM    Specimen: Urine, Clean Catch   Result Value Ref Range    Color, UA Red (A) Yellow, Straw    Appearance, UA Turbid (A) Clear    pH, UA <=5.0 5.0 - 8.0    Specific Gravity, UA 1.088 (H) 1.001 - 1.030    Glucose, UA Negative Negative    Ketones, UA Negative Negative    Bilirubin, UA Moderate (2+) (A) Negative    Blood, UA Large (3+) (A) Negative    Protein, UA >=300 mg/dL (3+) (A) Negative    Leuk Esterase, UA Small (1+) (A) Negative    Nitrite, UA Positive (A) Negative    Urobilinogen, UA 0.2 E.U./dL 0.2 - 1.0 E.U./dL   Urinalysis, Microscopic Only - Urine, Clean Catch    Collection Time: 10/05/22  1:03 AM    Specimen: Urine, Clean Catch   Result Value Ref Range    RBC, UA Too Numerous to Count (A) None Seen, 0-2 /HPF    WBC, UA Too Numerous to Count (A) None Seen, 0-2 /HPF    Bacteria, UA None Seen None Seen,  Trace /HPF    Squamous Epithelial Cells, UA 7-12 (A) None Seen, 0-2 /HPF    Hyaline Casts, UA 0-6 0 - 6 /LPF    Methodology Manual Light Microscopy      Note: In addition to lab results from this visit, the labs listed above may include labs taken at another facility or during a different encounter within the last 24 hours. Please correlate lab times with ED admission and discharge times for further clarification of the services performed during this visit.    CT Abdomen Pelvis With Contrast   Final Result       1.  Left iliac vein stent without evidence of complication.                  Electronically signed by:  Halima Eng M.D.     10/4/2022 9:34 PM Mountain Time      CT Lower Extremity Left With Contrast   Final Result      1.  Unremarkable exam.               Electronically signed by:  Halima Eng M.D.     10/4/2022 10:05 PM Mountain Time        Vitals:    10/04/22 2337 10/04/22 2350 10/05/22 0030 10/05/22 0037   BP:   103/63    BP Location:       Patient Position:       Pulse:  68  51   Resp:       Temp:       TempSrc:       SpO2: 97%  96%    Weight:       Height:         Medications   sodium chloride 0.9 % flush 10 mL (has no administration in time range)   Morphine sulfate (PF) injection 4 mg (4 mg Intravenous Given 10/4/22 2245)   ondansetron (ZOFRAN) injection 4 mg (4 mg Intravenous Given 10/4/22 2245)   sodium chloride 0.9 % bolus 1,000 mL (0 mL Intravenous Stopped 10/5/22 0334)   iopamidol (ISOVUE-300) 61 % injection 100 mL (100 mL Intravenous Given 10/4/22 2300)   metoclopramide (REGLAN) injection 10 mg (10 mg Intravenous Given 10/5/22 0252)   nitrofurantoin (macrocrystal-monohydrate) (MACROBID) capsule 100 mg (100 mg Oral Given 10/5/22 0308)   oxyCODONE-acetaminophen (PERCOCET) 5-325 MG per tablet 1 tablet (1 tablet Oral Given 10/5/22 0308)   ondansetron (ZOFRAN) injection 4 mg (4 mg Intravenous Given 10/5/22 0308)     ECG/EMG Results (last 24 hours)     ** No results found for the last 24  hours. **        No orders to display                                    MDM    Final diagnoses:   Acute bilateral low back pain without sciatica   History of vascular surgery   Acute UTI   Nausea and vomiting, unspecified vomiting type   Pelvic congestion syndrome   History of chronic pain   History of posttraumatic stress disorder (PTSD)   History of fibromyalgia       ED Disposition  ED Disposition     ED Disposition   Discharge    Condition   Stable    Comment   --             Rober Strong MD  [address]  [address]  [place] [address]  [phone]    Call today  Call today for first available recheck.    [facility] Emergency Department  [address] [facility] [address] [phone]    If symptoms worsen         Medication List      New Prescriptions    fluconazole 150 MG tablet  Commonly known as: DIFLUCAN  Take 1 tablet by mouth 1 (One) Time for 1 dose.     nitrofurantoin (macrocrystal-monohydrate) 100 MG capsule  Commonly known as: MACROBID  Take 1 capsule by mouth 2 (Two) Times a Day.        Changed    * ondansetron ODT 4 MG disintegrating tablet  Commonly known as: ZOFRAN-ODT  DISSOLVE 1 TABLET ON THE TONGUE EVERY 6 HOURS.  What changed: Another medication with the same name was added. Make sure you understand how and when to take each.     * ondansetron ODT 4 MG disintegrating tablet  Commonly known as: ZOFRAN-ODT  Place 1 tablet on the tongue Every 4 (Four) Hours.  What changed: You were already taking a medication with the same name, and this prescription was added. Make sure you understand how and when to take each.         * This list has 2 medication(s) that are the same as other medications prescribed for you. Read the directions carefully, and ask your doctor or other care provider to review them with you.               Where to Get Your Medications      These medications were sent to [facility]/pharmacy #[id number] - [place], [place] - [address] [place] [address] OF  [facility] - [phone]  - [phone] FX  [address] [place] [address]    Phone: [phone]   · fluconazole 150 MG tablet  · nitrofurantoin (macrocrystal-monohydrate) 100 MG capsule  · ondansetron ODT 4 MG disintegrating tablet          Tania Gómez PA-C  10/05/22 034

## 2022-10-05 NOTE — DISCHARGE INSTRUCTIONS
ER evaluation reveals normal CBC and chemistries.  Urinalysis reveals acute infectious process.  Urine culture is in process.  CT the abdomen/pelvis with contrast and left lower extremity with contrast reveals patent stent to the left common iliac vein.  There is no postprocedure complications or evidence of acute infectious or inflammatory process.  Discussed the case with on-call vascular surgeon, Dr. Moe.  He recommends calling the office later this morning and either here Dr. Strong will be happy to see the patient.  Continue with tramadol as prescribed for pain.  Increase fluids.  Rx for Macrobid 100 mg by mouth twice daily x7 days.  Return to the ER if worsening symptoms.  Continue with all other current medical management.

## 2022-10-06 LAB — BACTERIA SPEC AEROBE CULT: NORMAL

## 2022-10-10 NOTE — TELEPHONE ENCOUNTER
From: Josephine Taylor  To: Faye Zavaleta MD  Sent: 9/30/2022 2:59 PM EDT  Subject: Cyst/Abcess    Hello,   I within the last few days have had lymph nodes on the right side of my groin area swollen again. Also, there is another spot that is swollen near them again. It isn't in the same spot as before but I am concerned because of what happened before. There has also been a different smell, and discharge that just recently has come up too, like before. Im not really sure what to do about it. I think I may have a yeast infection, but am not positive. Also, I never received a call about the referral that you put in about seeing someone for my scoliosis.

## 2022-10-19 DIAGNOSIS — M41.9 SCOLIOSIS, UNSPECIFIED SCOLIOSIS TYPE, UNSPECIFIED SPINAL REGION: Primary | ICD-10-CM

## 2022-10-25 ENCOUNTER — PRIOR AUTHORIZATION (OUTPATIENT)
Dept: NEUROLOGY | Facility: CLINIC | Age: 32
End: 2022-10-25

## 2022-11-22 ENCOUNTER — PATIENT MESSAGE (OUTPATIENT)
Dept: FAMILY MEDICINE CLINIC | Facility: CLINIC | Age: 32
End: 2022-11-22

## 2022-11-22 DIAGNOSIS — N92.4 EXCESSIVE BLEEDING IN PREMENOPAUSAL PERIOD: Primary | ICD-10-CM

## 2022-11-29 ENCOUNTER — HOSPITAL ENCOUNTER (OUTPATIENT)
Dept: GENERAL RADIOLOGY | Facility: HOSPITAL | Age: 32
Discharge: HOME | End: 2022-11-29
Admitting: FAMILY MEDICINE

## 2022-11-29 ENCOUNTER — APPOINTMENT (OUTPATIENT)
Dept: GENERAL RADIOLOGY | Facility: HOSPITAL | Age: 32
End: 2022-11-29

## 2022-11-29 DIAGNOSIS — M41.9 SCOLIOSIS, UNSPECIFIED SCOLIOSIS TYPE, UNSPECIFIED SPINAL REGION: ICD-10-CM

## 2022-11-29 PROCEDURE — 72081 X-RAY EXAM ENTIRE SPI 1 VW: CPT

## 2022-12-15 ENCOUNTER — TRANSCRIBE ORDERS (OUTPATIENT)
Dept: LAB | Facility: HOSPITAL | Age: 32
End: 2022-12-15

## 2022-12-15 ENCOUNTER — LAB (OUTPATIENT)
Dept: LAB | Facility: HOSPITAL | Age: 32
End: 2022-12-15

## 2022-12-15 DIAGNOSIS — N92.4 EXCESSIVE BLEEDING IN PREMENOPAUSAL PERIOD: ICD-10-CM

## 2022-12-15 DIAGNOSIS — F41.0 PANIC DISORDER WITHOUT AGORAPHOBIA: Primary | ICD-10-CM

## 2022-12-15 DIAGNOSIS — F41.0 PANIC DISORDER WITHOUT AGORAPHOBIA: ICD-10-CM

## 2022-12-15 LAB
AMPHET+METHAMPHET UR QL: NEGATIVE
AMPHETAMINES UR QL: NEGATIVE
BARBITURATES UR QL SCN: NEGATIVE
BENZODIAZ UR QL SCN: NEGATIVE
BUPRENORPHINE SERPL-MCNC: NEGATIVE NG/ML
CANNABINOIDS SERPL QL: POSITIVE
COCAINE UR QL: NEGATIVE
DEPRECATED RDW RBC AUTO: 42.9 FL (ref 37–54)
ERYTHROCYTE [DISTWIDTH] IN BLOOD BY AUTOMATED COUNT: 11.9 % (ref 12.3–15.4)
HCT VFR BLD AUTO: 39 % (ref 34–46.6)
HGB BLD-MCNC: 12.8 G/DL (ref 12–15.9)
IRON 24H UR-MRATE: 79 MCG/DL (ref 37–145)
IRON SATN MFR SERPL: 21 % (ref 20–50)
MCH RBC QN AUTO: 32.2 PG (ref 26.6–33)
MCHC RBC AUTO-ENTMCNC: 32.8 G/DL (ref 31.5–35.7)
MCV RBC AUTO: 98.2 FL (ref 79–97)
METHADONE UR QL SCN: NEGATIVE
OPIATES UR QL: NEGATIVE
OXYCODONE UR QL SCN: NEGATIVE
PCP UR QL SCN: NEGATIVE
PLATELET # BLD AUTO: 225 10*3/MM3 (ref 140–450)
PMV BLD AUTO: 10.6 FL (ref 6–12)
PROPOXYPH UR QL: NEGATIVE
RBC # BLD AUTO: 3.97 10*6/MM3 (ref 3.77–5.28)
TIBC SERPL-MCNC: 377 MCG/DL (ref 298–536)
TRANSFERRIN SERPL-MCNC: 253 MG/DL (ref 200–360)
TRICYCLICS UR QL SCN: NEGATIVE
WBC NRBC COR # BLD: 3.1 10*3/MM3 (ref 3.4–10.8)

## 2022-12-15 PROCEDURE — 83540 ASSAY OF IRON: CPT

## 2022-12-15 PROCEDURE — 84466 ASSAY OF TRANSFERRIN: CPT

## 2022-12-15 PROCEDURE — 80306 DRUG TEST PRSMV INSTRMNT: CPT

## 2022-12-15 PROCEDURE — 85027 COMPLETE CBC AUTOMATED: CPT

## 2022-12-22 ENCOUNTER — OFFICE VISIT (OUTPATIENT)
Dept: NEUROSURGERY | Facility: CLINIC | Age: 32
End: 2022-12-22

## 2022-12-22 VITALS — WEIGHT: 120.4 LBS | HEIGHT: 65 IN | BODY MASS INDEX: 20.06 KG/M2 | TEMPERATURE: 98.9 F

## 2022-12-22 DIAGNOSIS — F41.9 ANXIETY: ICD-10-CM

## 2022-12-22 DIAGNOSIS — M54.41 CHRONIC BILATERAL LOW BACK PAIN WITH BILATERAL SCIATICA: ICD-10-CM

## 2022-12-22 DIAGNOSIS — G89.29 CHRONIC BILATERAL LOW BACK PAIN WITH BILATERAL SCIATICA: ICD-10-CM

## 2022-12-22 DIAGNOSIS — M79.7 FIBROMYALGIA: ICD-10-CM

## 2022-12-22 DIAGNOSIS — M54.42 CHRONIC BILATERAL LOW BACK PAIN WITH BILATERAL SCIATICA: ICD-10-CM

## 2022-12-22 DIAGNOSIS — M41.9 SCOLIOSIS, UNSPECIFIED SCOLIOSIS TYPE, UNSPECIFIED SPINAL REGION: Primary | ICD-10-CM

## 2022-12-22 PROCEDURE — 99204 OFFICE O/P NEW MOD 45 MIN: CPT

## 2022-12-22 RX ORDER — TRAMADOL HYDROCHLORIDE 50 MG/1
50 TABLET ORAL EVERY 6 HOURS PRN
COMMUNITY
Start: 2022-10-03 | End: 2022-12-22

## 2022-12-22 RX ORDER — CLOPIDOGREL BISULFATE 75 MG/1
75 TABLET ORAL DAILY
COMMUNITY
Start: 2022-10-03 | End: 2023-01-09

## 2022-12-22 RX ORDER — ASPIRIN 81 MG/1
81 TABLET, COATED ORAL DAILY
COMMUNITY
Start: 2022-10-03 | End: 2023-01-09

## 2022-12-22 NOTE — PROGRESS NOTES
Subjective   Patient: Josephine Taylor   Age, Date of Birth: 32 y.o., 1990  Sex: female     Primary Care Provider: Faye Bee MD    Chief Complaint: Low back pain radiating bilaterally    History of Present Illness:  Patient is a 32-year-old female who has had chronic back pain and carries a diagnosis of scoliosis with her, she also has past medical history of fibromyalgia, renal coiling, left femoral artery stenting.  Patient reports increasing back pain over the last year, she reports the pain is a belt-like distribution across her lower back but will radiate from her back into her anterior thighs bilaterally.  She had been experiencing worse back pain and leg pain prior to her iliac artery stent placement this year, but has had some residual back pain since that stent was placed and is the main reason for her appointment today.  She has attempted physical therapy and essentially graduated after doing it for so long.  She did do a chiropractor but stopped secondary to pain.  She also was seen by pain management who gave her steroid injections which were of minor benefit after a years time and she no longer receives them.  She denies urinary or bowel incontinence, saddle anesthesia.    Current Outpatient Medications   Medication Sig Dispense Refill   • Aspirin Low Dose 81 MG EC tablet Take 81 mg by mouth Daily.     • clonazePAM (KlonoPIN) 0.5 MG tablet Take 1 tablet by mouth.     • clopidogrel (PLAVIX) 75 MG tablet Take 75 mg by mouth Daily.     • Nurtec 75 MG dispersible tablet Take 1 tablet by mouth Every Other Day. (Patient taking differently: Take 75 mg by mouth As Needed.) 16 tablet 5   • ondansetron ODT (ZOFRAN-ODT) 4 MG disintegrating tablet DISSOLVE 1 TABLET ON THE TONGUE EVERY 6 HOURS. 120 tablet 0     No current facility-administered medications for this visit.       Allergies   Allergen Reactions   • Galcanezumab-Gnlm Shortness Of Breath   • Aimovig [Erenumab-Aooe] Other (See Comments)      Told to avoid due to hives, SOA with Emgality   • Sulfa Antibiotics Hives   • Venlafaxine Other (See Comments)     Worsened headaches and severe mood changes   • Ciprofloxacin Unknown - High Severity       Past Medical History:   Diagnosis Date   • Anxiety    • Arthritis    • Back problem    • Bronchitis    • Chronic abdominal pain    • Chronic nausea    • Colitis    • CTS (carpal tunnel syndrome)    • CYP2D6 poor metabolizer (HCC)    • Depression    • Difficulty walking    • Fibromyalgia, primary    • Genitofemoral neuropathy     left   • Insomnia    • Kidney infection    • Memory loss    • Migraines    • Panic disorder    • Postconcussion syndrome    • PTSD (post-traumatic stress disorder)    • Scoliosis    • Traumatic brain injury 2019   • Vitamin D deficiency        Social History     Socioeconomic History   • Marital status: Legally    Tobacco Use   • Smoking status: Every Day     Packs/day: 1.00     Years: 15.00     Pack years: 15.00     Types: Cigarettes     Start date: 1/1/2005   • Smokeless tobacco: Never   Vaping Use   • Vaping Use: Never used   Substance and Sexual Activity   • Alcohol use: Never   • Drug use: Not Currently     Types: Marijuana     Comment: Pt states that she smokes about a few times a week.   • Sexual activity: Defer     Partners: Male     Birth control/protection: None       Family History   Problem Relation Age of Onset   • Anxiety disorder Mother    • Hypertension Mother    • Arthritis Mother    • Mental illness Mother    • Lupus Mother    • Fibromyalgia Mother    • Depression Father    • COPD Father    • Kidney disease Father    • Heart disease Father    • Heart attack Father    • Mental illness Father    • Heart failure Father    • Anxiety disorder Sister    • Migraines Sister    • Depression Sister    • Anxiety disorder Sister    • ADD / ADHD Sister    • Asthma Son    • Melanoma Maternal Grandmother    • Brain cancer Maternal Grandfather    • Colon cancer Neg Hx    •  Colon polyps Neg Hx    • Esophageal cancer Neg Hx        Review of Systems   Constitutional: Positive for activity change, appetite change, chills, fatigue and unexpected weight change. Negative for diaphoresis and fever.   HENT: Positive for dental problem. Negative for congestion, drooling, ear discharge, ear pain, facial swelling, hearing loss, mouth sores, nosebleeds, postnasal drip, rhinorrhea, sinus pressure, sinus pain, sneezing, sore throat, tinnitus, trouble swallowing and voice change.    Eyes: Positive for pain and visual disturbance. Negative for photophobia, discharge, redness and itching.   Respiratory: Positive for chest tightness and shortness of breath. Negative for apnea, cough, choking, wheezing and stridor.    Cardiovascular: Positive for chest pain. Negative for palpitations and leg swelling.   Gastrointestinal: Positive for abdominal pain and nausea. Negative for abdominal distention, anal bleeding, blood in stool, constipation, diarrhea, rectal pain and vomiting.   Endocrine: Positive for cold intolerance. Negative for heat intolerance, polydipsia, polyphagia and polyuria.   Genitourinary: Positive for flank pain, menstrual problem and pelvic pain. Negative for decreased urine volume, difficulty urinating, dysuria, enuresis, frequency, genital sores, hematuria and urgency.   Musculoskeletal: Positive for back pain, joint swelling, myalgias, neck pain and neck stiffness. Negative for arthralgias and gait problem.   Skin: Negative for color change, pallor, rash and wound.   Allergic/Immunologic: Negative for environmental allergies, food allergies and immunocompromised state.   Neurological: Positive for tremors, weakness, numbness and headaches. Negative for dizziness, seizures, syncope, facial asymmetry, speech difficulty and light-headedness.   Hematological: Negative for adenopathy. Does not bruise/bleed easily.   Psychiatric/Behavioral: Positive for agitation, confusion, decreased  "concentration and sleep disturbance. Negative for behavioral problems, dysphoric mood, hallucinations, self-injury and suicidal ideas. The patient is nervous/anxious. The patient is not hyperactive.    All other systems reviewed and are negative.      Objective   Vitals:    12/22/22 0845   Temp: 98.9 °F (37.2 °C)   TempSrc: Infrared   Weight: 54.6 kg (120 lb 6.4 oz)   Height: 165.1 cm (65\")        Physical Exam:    Physical Exam  Vitals and nursing note reviewed.   Constitutional:       Appearance: Normal appearance.   HENT:      Head: Normocephalic and atraumatic.   Musculoskeletal:      Right lower leg: No edema.      Left lower leg: No edema.        Legs:       Comments: Patient had 5 out of 5 strength with hip flexion, plantar and dorsiflexion bilaterally.  Patient's pain distribution is noted above in red.   Neurological:      Mental Status: She is alert.      Cranial Nerves: No cranial nerve deficit or facial asymmetry.      Sensory: Sensation is intact. No sensory deficit.      Motor: Motor function is intact. No weakness, tremor, atrophy, abnormal muscle tone or seizure activity.      Coordination: Romberg sign negative.      Gait: Gait is intact. Gait and tandem walk normal.      Deep Tendon Reflexes:      Reflex Scores:       Patellar reflexes are 2+ on the right side and 2+ on the left side.       Achilles reflexes are 2+ on the right side and 2+ on the left side.     Comments: Straight leg raise was negative bilaterally.    Intact vibratory and temperature sensation bilaterally.  No signs of ankle clonus bilaterally.  Was able to ambulate on heels and tiptoes free of pain.   Psychiatric:         Mood and Affect: Mood normal.         Behavior: Behavior normal.         Thought Content: Thought content normal.         Judgment: Judgment normal.         Tobacco Use: High Risk   • Smoking Tobacco Use: Every Day   • Smokeless Tobacco Use: Never   • Passive Exposure: Not on file     Encouraged the patient to " quit smoking as that contributes to atherosclerosis which likely contributed to her past vascular history, also educated her it can be a contraindication to surgery.    BMI is below normal parameters (malnutrition). Recommendations: referral to primary care      STEADI Fall Risk Assessment has not been completed.    Assessment & Plan     Data Review:  (All imaging is independently reviewed unless stated otherwise.)  X-ray of the lumbar spine from 11/29/2022 shows extensive scoliosis of lumbar thoracic and possibly in cervical spine.    Medical Decision Making:  Patient symptoms could be consistent with an L2-L3 radiculopathy, as such I will order an MRI of the lumbar spine as well as flexion-extension films to look for any structural deformities.  Patient was warned in advance that she has major structural issues with her spine that we are on able to address at this practice, and if we are unable to find neuroforaminal narrowing or central stenosis, it is unlikely we will be able to help her chronic back pain.  That being said I will see the patient back after her MRI has been completed, I will discuss treatment options with Dr. Rylan Qiu.  Patient encouraged to contact us if she has any changes in her condition or any concerns.     Diagnosis Plan   1. Scoliosis, unspecified scoliosis type, unspecified spinal region  MRI Lumbar Spine Without Contrast    XR Spine Cervical Complete With Flex Ext      2. Chronic bilateral low back pain with bilateral sciatica  MRI Lumbar Spine Without Contrast    XR Spine Cervical Complete With Flex Ext      3. Anxiety        4. Fibromyalgia             Electronically signed and dated:    Luis Enrique Weinstein PA-C on 09:33 EST 12/22/22

## 2023-01-09 ENCOUNTER — OFFICE VISIT (OUTPATIENT)
Dept: FAMILY MEDICINE CLINIC | Facility: CLINIC | Age: 33
End: 2023-01-09
Payer: MEDICAID

## 2023-01-09 ENCOUNTER — APPOINTMENT (OUTPATIENT)
Dept: WOMENS IMAGING | Facility: HOSPITAL | Age: 33
End: 2023-01-09

## 2023-01-09 VITALS
BODY MASS INDEX: 19.53 KG/M2 | OXYGEN SATURATION: 95 % | WEIGHT: 117.2 LBS | HEART RATE: 65 BPM | SYSTOLIC BLOOD PRESSURE: 110 MMHG | HEIGHT: 65 IN | DIASTOLIC BLOOD PRESSURE: 70 MMHG

## 2023-01-09 DIAGNOSIS — Z11.3 ROUTINE SCREENING FOR STI (SEXUALLY TRANSMITTED INFECTION): ICD-10-CM

## 2023-01-09 DIAGNOSIS — R11.0 CHRONIC NAUSEA: ICD-10-CM

## 2023-01-09 DIAGNOSIS — R19.09 SUPRAPUBIC MASS: Primary | ICD-10-CM

## 2023-01-09 PROCEDURE — 99213 OFFICE O/P EST LOW 20 MIN: CPT | Performed by: FAMILY MEDICINE

## 2023-01-09 PROCEDURE — 87661 TRICHOMONAS VAGINALIS AMPLIF: CPT | Performed by: FAMILY MEDICINE

## 2023-01-09 PROCEDURE — 87801 DETECT AGNT MULT DNA AMPLI: CPT | Performed by: FAMILY MEDICINE

## 2023-01-09 PROCEDURE — 87591 N.GONORRHOEAE DNA AMP PROB: CPT | Performed by: FAMILY MEDICINE

## 2023-01-09 PROCEDURE — 87798 DETECT AGENT NOS DNA AMP: CPT | Performed by: FAMILY MEDICINE

## 2023-01-09 PROCEDURE — 87491 CHLMYD TRACH DNA AMP PROBE: CPT | Performed by: FAMILY MEDICINE

## 2023-01-09 RX ORDER — CEPHALEXIN 500 MG/1
500 CAPSULE ORAL 2 TIMES DAILY
Qty: 14 CAPSULE | Refills: 0 | Status: SHIPPED | OUTPATIENT
Start: 2023-01-09 | End: 2023-01-16

## 2023-01-09 RX ORDER — ONDANSETRON 4 MG/1
4 TABLET, ORALLY DISINTEGRATING ORAL EVERY 6 HOURS PRN
Qty: 120 TABLET | Refills: 3 | Status: SHIPPED | OUTPATIENT
Start: 2023-01-09

## 2023-01-09 NOTE — PROGRESS NOTES
Chief Complaint  Adenopathy (Swollen lymph nodes in groin/Started end of September beginning of October, then it went down but has swelled back up again)    Subjective        Josephine Taylor presents to [facility]  History of Present Illness     She had bilateral lymph node swelling in groin, resolved but now returned on right side mainly. She has prior h/o labial abscess. She has been \"running warm and body hotter than normal.\" She had stent placed in iliac vein and she used plavix for 90 days. Periods more frequent and extremely bad bleeding. Sexually active with men, no condom use. LMP yesterday.                 Review of Systems   Constitutional: Negative for chills and fever.   Genitourinary: Negative for vaginal discharge.         Objective   Vital Signs:  /70   Pulse 65   Ht 165.1 cm (65\")   Wt 53.2 kg (117 lb 3.2 oz)   SpO2 95%   BMI 19.50 kg/m²   Estimated body mass index is 19.5 kg/m² as calculated from the following:    Height as of this encounter: 165.1 cm (65\").    Weight as of this encounter: 53.2 kg (117 lb 3.2 oz).    BMI is within normal parameters. No other follow-up for BMI required.      Physical Exam  Constitutional:       General: She is not in acute distress.     Appearance: She is not ill-appearing, toxic-appearing or diaphoretic.   Pulmonary:      Effort: No respiratory distress.   Abdominal:      Palpations: Abdomen is soft.          Comments: Pubic hair shaved   Genitourinary:     General: Normal vulva.      Labia:         Right: No rash, tenderness or lesion.         Left: No rash, tenderness or lesion.       Vagina: Bleeding present.      Cervix: Cervical bleeding present. No cervical motion tenderness.      Uterus: Normal. Not tender.       Adnexa:         Right: No tenderness or fullness.          Left: No tenderness or fullness.     Lymphadenopathy:      Lower Body: No right inguinal adenopathy. No left inguinal adenopathy.        Result  Review :                Assessment and Plan   Diagnoses and all orders for this visit:    1. Suprapubic mass (Primary)  -     cephalexin (Keflex) 500 MG capsule; Take 1 capsule by mouth 2 (Two) Times a Day for 7 days.  Dispense: 14 capsule; Refill: 0  New. Area of swelling more in suprapubic region rather than inguinal canal.  Concern if it could be skin infection and treat with Keflex.  Also vaginal swab testing today as below.  2. Routine screening for STI (sexually transmitted infection)  -     NuSwab VG+ - Swab, Vagina; Future  Nuswab collected today.  Also recommended lab testing for HIV, syphilis, and hepatitis but patient deferred.  3. Chronic nausea  -     ondansetron ODT (ZOFRAN-ODT) 4 MG disintegrating tablet; Place 1 tablet on the tongue Every 6 (Six) Hours As Needed for Nausea or Vomiting.  Dispense: 120 tablet; Refill: 3  Chronic.  Continue Zofran.           Follow Up   Return if symptoms worsen or fail to improve.  Patient was given instructions and counseling regarding her condition or for health maintenance advice. Please see specific information pulled into the AVS if appropriate.     Electronically signed by Faye Bee MD, 01/09/23, 1:38 PM EST.

## 2023-01-13 ENCOUNTER — TELEPHONE (OUTPATIENT)
Dept: FAMILY MEDICINE CLINIC | Facility: CLINIC | Age: 33
End: 2023-01-13
Payer: MEDICAID

## 2023-01-13 RX ORDER — METRONIDAZOLE 500 MG/1
500 TABLET ORAL 2 TIMES DAILY
Qty: 14 TABLET | Refills: 0 | Status: SHIPPED | OUTPATIENT
Start: 2023-01-13 | End: 2023-01-20

## 2023-01-13 NOTE — TELEPHONE ENCOUNTER
Attempted to contact patient, no answer. Unable to LVM.    Hub may relay message.      ----- Message from Faye Bee MD sent at 1/13/2023 12:28 PM EST -----  Vaginal swab is negative for gonorrhea, chlamydia, and trichomonas.  No yeast.  There is presence of bacterial vaginosis which is not a sexually transmitted infection.  It can be treated with metronidazole antibiotics.  You need to avoid alcohol while on antibiotics.

## 2023-01-16 NOTE — TELEPHONE ENCOUNTER
2nd attempt     Attempted to contact patient, no answer. Unable to LVM.     Hub may relay message.        ----- Message from Faye Bee MD sent at 1/13/2023 12:28 PM EST -----  Vaginal swab is negative for gonorrhea, chlamydia, and trichomonas.  No yeast.  There is presence of bacterial vaginosis which is not a sexually transmitted infection.  It can be treated with metronidazole antibiotics.  You need to avoid alcohol while on antibiotics.

## 2023-01-19 ENCOUNTER — HOSPITAL ENCOUNTER (OUTPATIENT)
Dept: ULTRASOUND IMAGING | Facility: HOSPITAL | Age: 33
Discharge: HOME | End: 2023-01-19
Admitting: FAMILY MEDICINE
Payer: MEDICAID

## 2023-01-19 DIAGNOSIS — N92.4 EXCESSIVE BLEEDING IN PREMENOPAUSAL PERIOD: ICD-10-CM

## 2023-01-19 PROCEDURE — 76830 TRANSVAGINAL US NON-OB: CPT

## 2023-01-23 ENCOUNTER — TELEPHONE (OUTPATIENT)
Dept: FAMILY MEDICINE CLINIC | Facility: CLINIC | Age: 33
End: 2023-01-23
Payer: MEDICAID

## 2023-01-23 NOTE — TELEPHONE ENCOUNTER
Attempted to contact patient no answer unable to leave vm due to phone just ringing      Message from Faye Bee MD sent at 1/23/2023  7:50 AM  Normal uterus and ovaries on ultrasound.        HUB MAY RELAY MESSAGE, AND DOCUMENT   
FEVER/NAUSEA/PAIN/VOMITING/DECREASED EATING/DRINKING

## 2023-02-03 ENCOUNTER — HOSPITAL ENCOUNTER (OUTPATIENT)
Dept: MRI IMAGING | Facility: HOSPITAL | Age: 33
Discharge: HOME | End: 2023-02-03
Payer: MEDICAID

## 2023-02-03 DIAGNOSIS — M54.42 CHRONIC BILATERAL LOW BACK PAIN WITH BILATERAL SCIATICA: ICD-10-CM

## 2023-02-03 DIAGNOSIS — G89.29 CHRONIC BILATERAL LOW BACK PAIN WITH BILATERAL SCIATICA: ICD-10-CM

## 2023-02-03 DIAGNOSIS — M54.41 CHRONIC BILATERAL LOW BACK PAIN WITH BILATERAL SCIATICA: ICD-10-CM

## 2023-02-03 DIAGNOSIS — M41.9 SCOLIOSIS, UNSPECIFIED SCOLIOSIS TYPE, UNSPECIFIED SPINAL REGION: ICD-10-CM

## 2023-02-03 PROCEDURE — 72148 MRI LUMBAR SPINE W/O DYE: CPT

## 2023-02-06 ENCOUNTER — OFFICE VISIT (OUTPATIENT)
Dept: NEUROSURGERY | Facility: CLINIC | Age: 33
End: 2023-02-06
Payer: MEDICAID

## 2023-02-06 ENCOUNTER — HOSPITAL ENCOUNTER (OUTPATIENT)
Dept: GENERAL RADIOLOGY | Facility: HOSPITAL | Age: 33
Discharge: HOME | End: 2023-02-06
Payer: MEDICAID

## 2023-02-06 VITALS — HEIGHT: 65 IN | WEIGHT: 118.2 LBS | BODY MASS INDEX: 19.69 KG/M2 | TEMPERATURE: 97.5 F

## 2023-02-06 DIAGNOSIS — M54.41 CHRONIC BILATERAL LOW BACK PAIN WITH BILATERAL SCIATICA: ICD-10-CM

## 2023-02-06 DIAGNOSIS — M54.42 CHRONIC BILATERAL LOW BACK PAIN WITH BILATERAL SCIATICA: Primary | ICD-10-CM

## 2023-02-06 DIAGNOSIS — M54.42 CHRONIC BILATERAL LOW BACK PAIN WITH BILATERAL SCIATICA: ICD-10-CM

## 2023-02-06 DIAGNOSIS — G89.29 CHRONIC BILATERAL LOW BACK PAIN WITH BILATERAL SCIATICA: Primary | ICD-10-CM

## 2023-02-06 DIAGNOSIS — G89.29 CHRONIC BILATERAL LOW BACK PAIN WITH BILATERAL SCIATICA: ICD-10-CM

## 2023-02-06 DIAGNOSIS — M41.9 SCOLIOSIS, UNSPECIFIED SCOLIOSIS TYPE, UNSPECIFIED SPINAL REGION: ICD-10-CM

## 2023-02-06 DIAGNOSIS — M54.41 CHRONIC BILATERAL LOW BACK PAIN WITH BILATERAL SCIATICA: Primary | ICD-10-CM

## 2023-02-06 DIAGNOSIS — M79.7 FIBROMYALGIA: ICD-10-CM

## 2023-02-06 PROCEDURE — 72114 X-RAY EXAM L-S SPINE BENDING: CPT

## 2023-02-06 PROCEDURE — 99214 OFFICE O/P EST MOD 30 MIN: CPT

## 2023-02-06 RX ORDER — ASPIRIN 81 MG/1
TABLET, CHEWABLE ORAL DAILY
COMMUNITY

## 2023-02-06 NOTE — PROGRESS NOTES
Subjective   Patient: Josephine Taylor   Age, Date of Birth: 32 y.o., 1990  Sex: female    Primary Care Provider: Faye Bee MD    Chief Complaint: Chronic low back pain with scoliosis    History of Present Illness:  Patient is a 32-year-old female who has had chronic back pain and past medical history of scoliosis as well as fibromyalgia, renal coiling, left femoral artery stenting.  Over the last year she has had increased low back pain that goes like a belt across her low back and will radiate into her anterior thighs bilaterally.  She has attempted physical therapy which has not helped her symptoms.  Patient stopped going to the chiropractor secondary to pain.  Patient has not been to pain management in some time.  She denies urinary or bowel incontinence, saddle anesthesia.      Current Outpatient Medications   Medication Sig Dispense Refill   • aspirin 81 MG chewable tablet Chew Daily.     • clonazePAM (KlonoPIN) 0.5 MG tablet Take 1 tablet by mouth.     • Nurtec 75 MG dispersible tablet Take 1 tablet by mouth Every Other Day. (Patient taking differently: Take 75 mg by mouth As Needed.) 16 tablet 5   • ondansetron ODT (ZOFRAN-ODT) 4 MG disintegrating tablet Place 1 tablet on the tongue Every 6 (Six) Hours As Needed for Nausea or Vomiting. 120 tablet 3     No current facility-administered medications for this visit.       Past Medical History:   Diagnosis Date   • Anxiety    • Arthritis    • Back problem    • Bronchitis    • Chronic abdominal pain    • Chronic nausea    • Colitis    • CTS (carpal tunnel syndrome)    • CYP2D6 poor metabolizer (HCC)    • Depression    • Difficulty walking    • Fibromyalgia, primary    • Genitofemoral neuropathy     left   • Insomnia    • Kidney infection    • Memory loss    • Migraines    • Panic disorder    • Postconcussion syndrome    • PTSD (post-traumatic stress disorder)    • Scoliosis    • Traumatic brain injury 2019   • Vitamin D deficiency        Review  of Systems   Constitutional: Positive for activity change, appetite change, chills, fatigue and unexpected weight change. Negative for diaphoresis and fever.   HENT: Positive for dental problem. Negative for congestion, drooling, ear discharge, ear pain, facial swelling, hearing loss, mouth sores, nosebleeds, postnasal drip, rhinorrhea, sinus pressure, sinus pain, sneezing, sore throat, tinnitus, trouble swallowing and voice change.    Eyes: Positive for pain and visual disturbance. Negative for photophobia, discharge, redness and itching.   Respiratory: Positive for chest tightness and shortness of breath. Negative for apnea, cough, choking, wheezing and stridor.    Cardiovascular: Positive for chest pain. Negative for palpitations and leg swelling.   Gastrointestinal: Positive for abdominal pain and nausea. Negative for abdominal distention, anal bleeding, blood in stool, constipation, diarrhea, rectal pain and vomiting.   Endocrine: Positive for cold intolerance. Negative for heat intolerance, polydipsia, polyphagia and polyuria.   Genitourinary: Positive for flank pain, menstrual problem and pelvic pain. Negative for decreased urine volume, difficulty urinating, dysuria, enuresis, frequency, genital sores, hematuria and urgency.   Musculoskeletal: Positive for back pain, joint swelling, myalgias, neck pain and neck stiffness. Negative for arthralgias and gait problem.   Skin: Negative for color change, pallor, rash and wound.   Allergic/Immunologic: Negative for environmental allergies, food allergies and immunocompromised state.   Neurological: Positive for tremors, weakness, numbness and headaches. Negative for dizziness, seizures, syncope, facial asymmetry, speech difficulty and light-headedness.   Hematological: Negative for adenopathy. Does not bruise/bleed easily.   Psychiatric/Behavioral: Positive for agitation, confusion, decreased concentration and sleep disturbance. Negative for behavioral problems,  "dysphoric mood, hallucinations, self-injury and suicidal ideas. The patient is nervous/anxious. The patient is not hyperactive.    All other systems reviewed and are negative.      Objective   Vitals:    02/06/23 1000   Temp: 97.5 °F (36.4 °C)   Weight: 53.6 kg (118 lb 3.2 oz)   Height: 165.1 cm (65\")        Physical Exam:    Physical Exam  Vitals and nursing note reviewed.   Constitutional:       Appearance: Normal appearance.   HENT:      Head: Normocephalic and atraumatic.   Musculoskeletal:      Right lower leg: No edema.      Left lower leg: No edema.        Legs:       Comments: Patient had 5 out of 5 strength with hip flexion, plantar and dorsiflexion bilaterally.  Patient's pain distribution is noted above in red.   Neurological:      Mental Status: She is alert.      Cranial Nerves: No cranial nerve deficit or facial asymmetry.      Sensory: Sensation is intact. No sensory deficit.      Motor: Motor function is intact. No weakness, tremor, atrophy, abnormal muscle tone or seizure activity.      Coordination: Romberg sign negative.      Gait: Gait is intact. Gait and tandem walk normal.      Deep Tendon Reflexes:      Reflex Scores:       Patellar reflexes are 2+ on the right side and 2+ on the left side.       Achilles reflexes are 2+ on the right side and 2+ on the left side.     Comments: Straight leg raise was negative bilaterally.    Intact vibratory and temperature sensation bilaterally.  No signs of ankle clonus bilaterally.  Was able to ambulate on heels and tiptoes free of pain.   Psychiatric:         Mood and Affect: Mood normal.         Behavior: Behavior normal.         Thought Content: Thought content normal.         Judgment: Judgment normal.         Tobacco Use: High Risk   • Smoking Tobacco Use: Every Day   • Smokeless Tobacco Use: Never   • Passive Exposure: Not on file     Strongly encouraged the patient to quit smoking as that is likely contributing to her low back pain.    BMI is within " normal parameters. No other follow-up for BMI required.      STEADI Fall Risk Assessment has not been completed.    Assessment & Plan     Data Review:  (All imaging is independently reviewed unless stated otherwise.)  MRI of the lumbar spine from 2/3/2023 does not show anything in the way of major central stenosis or neuroforaminal narrowing.  No signal cord changes noted either.    X-ray lumbar flexion-extension from 2/6/2023 does not show any dynamic movement of the vertebral bodies.      Medical Decision Making:  At this current interval our clinic has nothing to offer the patient from a surgical perspective.  The curvature of her spine really is not incredibly severe, I am unsure if any spinal reconstruction surgeons would have anything to offer her but we will at least give her the referral to see what options they might have for this patient.  Otherwise I recommend physical therapy and pain management for long-term management of her condition.  Also encouraged her to quit smoking and adapt other healthy lifestyle modifications to help mitigate her low back pain.  Though I cannot quantify the patient's limitations, subjectively she is in a fair amount of pain and I would be surprised if she would be able to complete any labor-intensive work secondary to this.  That including bending, lifting, twisting with greater than 10 pounds.  I have given her a referral to undergo a FCE with physical therapy to formally assess her condition.  Patient encouraged to contact us if she has any changes in her condition or any concerns.     Diagnosis Plan   1. Chronic bilateral low back pain with bilateral sciatica  Ambulatory Referral to Pain Management    Ambulatory Referral to Neurosurgery    Ambulatory Referral to Physical Therapy      2. Fibromyalgia        3. Scoliosis, unspecified scoliosis type, unspecified spinal region  Ambulatory Referral to Pain Management    Ambulatory Referral to Neurosurgery    Ambulatory Referral  to Physical Therapy           Electronically signed and dated:    Luis Enrique Weinstein PA-C on 14:17 EST 02/06/23

## 2023-02-09 ENCOUNTER — OFFICE VISIT (OUTPATIENT)
Dept: PAIN MEDICINE | Facility: CLINIC | Age: 33
End: 2023-02-09
Payer: MEDICAID

## 2023-02-09 VITALS
DIASTOLIC BLOOD PRESSURE: 62 MMHG | BODY MASS INDEX: 19.86 KG/M2 | OXYGEN SATURATION: 97 % | HEIGHT: 65 IN | WEIGHT: 119.2 LBS | RESPIRATION RATE: 12 BRPM | TEMPERATURE: 96.8 F | HEART RATE: 64 BPM | SYSTOLIC BLOOD PRESSURE: 102 MMHG

## 2023-02-09 DIAGNOSIS — M79.7 FIBROMYALGIA: Primary | ICD-10-CM

## 2023-02-09 DIAGNOSIS — G43.719 CHRONIC MIGRAINE WITHOUT AURA, INTRACTABLE, WITHOUT STATUS MIGRAINOSUS: ICD-10-CM

## 2023-02-09 DIAGNOSIS — F41.9 ANXIETY: Primary | ICD-10-CM

## 2023-02-09 DIAGNOSIS — M79.7 FIBROMYALGIA: ICD-10-CM

## 2023-02-09 PROCEDURE — 99204 OFFICE O/P NEW MOD 45 MIN: CPT | Performed by: STUDENT IN AN ORGANIZED HEALTH CARE EDUCATION/TRAINING PROGRAM

## 2023-02-09 NOTE — PROGRESS NOTES
02/09/2023      Referring Physician: Luis Enrique Weinstein PA-C  [address] [facility] [address]  [place] [address]    Primary Physician: Faye Bee MD    CHIEF COMPLAINT or REASON FOR VISIT: Back Pain (New patient)      HISTORY OF PRESENT ILLNESS:  Ms. Josephine Taylor is 32 y.o. female who presents as a new patient referral for evaluation and treatment of chronic total body pain.  Patient states that she has had achy pain from the base of her skull down throughout her entire body into her feet since as long as she can remember in her teenage years.  She describes a hypersensitivity to pressure and light touch including being a braised as well as increased pain after activity.  She does have a diagnosis of fibromyalgia for many years ago, previously trialed Cymbalta and Lyrica with side effects (she does not remember specifics).  She does endorse foggy thinking, feeling unrested, irritable bowel syndrome.    She does have a recent past medical history significant for an iliac stent in October 2022 after which she did have some increased back pain however that is largely resolved since that time.  Patient denies any bowel or bladder dysfunction, lower extremity weakness, new onset saddle anesthesia or unexplained weight loss.   She did seek consultation with neurosurgery, Luis Enrique Weinstein PA-C, who did not identify any surgical pathology.        Objective Pain Scoring:   BRIEF PAIN INVENTORY:  Total score:   Pain Score    02/09/23 0913   PainSc:   7   PainLoc: Back      PHQ-2: PHQ-2 Total Score: 3  PHQ-9: PHQ-9: Brief Depression Severity Measure Score: 16  Opioid Risk Tool:         Review of Systems:   ROS negative except as otherwise noted     Past Medical History:   Past Medical History:   Diagnosis Date   • Anxiety    • Arthritis    • Back problem    • Bronchitis    • Chronic abdominal pain    • Chronic nausea    • Chronic pain disorder 2004   • Colitis    • CTS (carpal tunnel syndrome)    • CYP2D6 poor metabolizer  ([facility])    • Depression    • Difficulty walking    • Fibromyalgia, primary    • Fractures rib    • Genitofemoral neuropathy     left   • Headache, tension-type 2019   • Insomnia    • Joint pain    • Kidney infection    • Low back pain    • Memory loss    • Migraines    • Neck pain    • Panic disorder    • Postconcussion syndrome    • PTSD (post-traumatic stress disorder)    • Scoliosis    • Traumatic brain injury 2019   • Vitamin D deficiency          Past Surgical History:   Past Surgical History:   Procedure Laterality Date   • ABDOMINAL SURGERY      coil renal?    • BREAST BIOPSY Left 2009    bx and excisional   • BREAST LUMPECTOMY     •  SECTION     • ILIAC ARTERY STENT     • TRIGGER POINT INJECTION     • VASCULAR SURGERY  [id number]    Embolism         Family History   Family History   Problem Relation Age of Onset   • Anxiety disorder Mother    • Hypertension Mother    • Arthritis Mother    • Mental illness Mother    • Lupus Mother    • Fibromyalgia Mother    • Depression Father    • COPD Father    • Kidney disease Father    • Heart disease Father    • Heart attack Father    • Mental illness Father    • Heart failure Father    • Alcohol abuse Father    • Anxiety disorder Sister    • Migraines Sister    • Depression Sister    • Depression Sister    • Anxiety disorder Sister    • ADD / ADHD Sister    • Asthma Son    • Melanoma Maternal Grandmother    • Brain cancer Maternal Grandfather    • Colon cancer Neg Hx    • Colon polyps Neg Hx    • Esophageal cancer Neg Hx          Social History   Social History     Socioeconomic History   • Marital status: Legally    Tobacco Use   • Smoking status: Every Day     Packs/day: 0.50     Years: 15.00     Pack years: 7.50     Types: Cigarettes     Start date: 2005   • Smokeless tobacco: Never   Vaping Use   • Vaping Use: Never used   Substance and Sexual Activity   • Alcohol use: Never   • Drug use: Never     Types: Marijuana     Comment:  "Pt states that she smokes about a few times a week.   • Sexual activity: Yes     Partners: Male     Birth control/protection: None        Medications:     Current Outpatient Medications:   •  aspirin 81 MG chewable tablet, Chew Daily., Disp: , Rfl:   •  clonazePAM (KlonoPIN) 0.5 MG tablet, Take 1 tablet by mouth., Disp: , Rfl:   •  Nurtec 75 MG dispersible tablet, Take 1 tablet by mouth Every Other Day. (Patient taking differently: Take 75 mg by mouth As Needed.), Disp: 16 tablet, Rfl: 5  •  ondansetron ODT (ZOFRAN-ODT) 4 MG disintegrating tablet, Place 1 tablet on the tongue Every 6 (Six) Hours As Needed for Nausea or Vomiting., Disp: 120 tablet, Rfl: 3        Physical Exam:     Vitals:    02/09/23 0913   BP: 102/62   BP Location: Left arm   Patient Position: Sitting   Cuff Size: Adult   Pulse: 64   Resp: 12   Temp: 96.8 °F (36 °C)   TempSrc: Infrared   SpO2: 97%   Weight: 54.1 kg (119 lb 3.2 oz)   Height: 165.1 cm (65\")   PainSc:   7   PainLoc: Back        General: Alert and oriented, No acute distress.   HEENT: Normocephalic, atraumatic.   Cardiovascular: No gross edema  Respiratory: Respirations are non-labored    Cervical Spine:   No masses or atrophy  Range of motion - Flexion normal. Extension normal. Lateral rotation normal.   Palpation -diffusely tender  Spurling's - negative     Thoracic Spine:   Inspection: no gross abnormality  Paraspinal muscle palpation: Diffusely tender  Spinous process palpation: nontender    Lumbar Spine:   No masses or atrophy  Range of motion - Flexion normal. Extension normal. Right Lat Bending normal. Left Lat Bending normal  Facet Loading: Negative bilaterally  Facet Palpation - diffusely tender  PSIS tenderness - Negative bilaterally  Robert's/VLADIMIR/Thigh thrust - Negative bilaterally  Straight leg raise: Negative bilaterally  Slump test: Negative bilaterally    Motor Exam:    Strength: Rate on 1-5 scale Right Left    C5-Elbow flexion, Deltoid 5 5    C6-Wrist extension 5 " 5    C7- Elbow / finger extension 5 5    C8- Finger flexion 5 5    T1- [employer] hand 5 5    Strength: Rate on 1-5 scale Right Left    L1/2- hip flexion 5 5    L3- knee extension 5 5    L4- ankle dorsiflexion 5 5    L5- great toe extension 5 5    S1- ankle plantarflexion 5 5    Sensory Exam: Full and equal sensation to light touch throughout.    Neurologic: Cranial Nerves II-XII are grossly intact.   Ibarra’s -negative bilaterally   Clonus -negative bilaterally    Psychiatric: Cooperative.   Gait: Normal   Assistive Devices: None    Imaging Studies:   Results for orders placed during the hospital encounter of 02/03/23    MRI Lumbar Spine Without Contrast    Narrative  MRI LUMBAR SPINE WO CONTRAST    Date of Exam: 2/3/2023 12:01 PM EST    Indication: worsening low back pain.    Comparison: 1/4/2020 0    Technique:  Routine multiplanar/multisequence sequence images of the lumbar spine were obtained without contrast administration.    Findings:  The last well formed disc space is labeled as L5-S1.    Distal cord and conus: Normal morphology and signal. The conus medullaris terminates at L1.    Cauda equina and nerve roots: Normal morphology and signal.    Alignment: Levocurvature of the lumbar spine.  Exaggerated lumbar lordosis.    Bones: The vertebral body heights are preserved. The bone marrow signal is within normal limits for age. No suspicious osseous lesions are demonstrated.    Description of degenerative changes at specific levels is as follows:    T12-L1: No spinal canal or neural foraminal stenosis.    L1-2: Minimal posterior disc bulge. No spinal canal or neural foraminal stenosis.    L2-3: No spinal canal or neural foraminal stenosis.    L3-4: No spinal canal or neural foraminal stenosis.    L4-5: No spinal canal and neural foraminal stenosis.    L5-S1: Moderate facet arthropathy and mild canal narrowing.  No neural foraminal narrowing. This is similar to prior examination.    Extra-vertebral soft  tissues: Normal.    Visualized abdomen/pelvis: Normal.    Impression  Impression:  Minimal degenerative changes of the lumbar spine. No high-grade canal or foraminal stenosis.    Electronically Signed: Fer DamianCarlos Alberto  2/6/2023 9:59 AM EST  Workstation ID: [id number]      Results for orders placed during the hospital encounter of 01/04/22    MRI Lumbar Spine Without Contrast    Narrative  EXAMINATION: MRI LUMBAR SPINE WO CONTRAST-    INDICATION: M54.16-Radiculopathy, lumbar region.    TECHNIQUE: Sagittal T1 and T2 STIR, axial and coronal T2-weighted  images.    COMPARISON: Lumbar plain films 10/05/2021.    FINDINGS: There is exaggeration of the normal lumbar lordosis and  somewhat transitional appearing lumbosacral anatomy. For this exam, the  most inferior full height disc space will be termed L5-S1.    There is minimal anterior subluxation of L5 on S1. No pars defects are  apparent. No significant abnormality of alignment is seen. No  compression deformity or vertebral marrow edema is seen. Discs appear  well hydrated. No HNP or canal stenosis is appreciated. Tip of the conus  medullaris lies at T12-L1.    Axial images show no significant canal or foraminal stenosis at T12-L1.    At L1-L2, canal and foramina appear within normal limits.    At L2-L3, canal and foramina appear within normal limits.    At L3-L4, canal and foramina appear normal.    At L4-L5, there is a minimal disc bulge with no apparent canal or  foraminal stenosis.    At L5-S1, the canal appears narrowed as a result of disc protrusion, but  as the thecal sac is already tapering at this level, there appears to be  only mild canal stenosis. The foramina appear normally patent.    Nerve roots of the cauda equina appear grossly normal. No evidence of  pathologic paraspinous mass or acute inflammatory focus is appreciated.    Impression  1. Exaggerated lumbar lordosis and transitional lumbosacral anatomy.  2. Mild L5-S1 canal stenosis. No evidence of HNP  or significant canal  stenosis elsewhere. No evidence of acute or healing trauma.    D:  01/04/2022  E:  01/04/2022    This report was finalized on 1/4/2022 9:51 PM by Dr. Jung Calloway MD.      Impression & Plan:   Ms. Josephine Taylor is a 32 y.o. female with past medical history significant for fibromyalgia who presents to the pain clinic for evaluation and treatment of chronic total body pain.  I did personally review the patient's lumbar MRI which was essentially normal.  On exam she does have some accentuated lordosis of the thoracolumbar junction.  Clinical examination consistent with fibromyalgia.  We discussed the diagnosis of fibromyalgia as well as the evidence-based treatments including membrane stabilizers (gabapentin, pregabalin), SNRI (duloxetine, venlafaxine), physical activity (aqua therapy, olimpia yoga) and cognitive behavioral therapy.    1. Fibromyalgia        PLAN:  1. Medication Recommendations: Previously failed Lyrica, Cymbalta.    2. Physical Therapy: Patient counseled to trial begintabitha yoga, aqua therapy    3. Psychological: Referral for cognitive behavioral therapy    4. Complementary and alternative (CAM) Therapies:     5. Labs: None indicated     6. Imaging: I personally reviewed patient's MRI and reviewed with her in room    7. Interventions: None indicated     8. Referrals: None indicated     9. Records requested: n/a    10. Lifestyle goals:    Follow-up as needed      [facility]  Manpreet Santacruz MD

## 2023-04-17 ENCOUNTER — PATIENT MESSAGE (OUTPATIENT)
Dept: FAMILY MEDICINE CLINIC | Facility: CLINIC | Age: 33
End: 2023-04-17
Payer: MEDICAID

## 2023-04-17 DIAGNOSIS — M41.9 SCOLIOSIS, UNSPECIFIED SCOLIOSIS TYPE, UNSPECIFIED SPINAL REGION: Primary | ICD-10-CM

## 2023-07-21 PROBLEM — S91.312A LACERATION OF LEFT FOOT: Status: ACTIVE | Noted: 2023-07-21

## 2023-07-26 ENCOUNTER — OFFICE VISIT (OUTPATIENT)
Dept: FAMILY MEDICINE CLINIC | Facility: CLINIC | Age: 33
End: 2023-07-26
Payer: MEDICAID

## 2023-07-26 VITALS
SYSTOLIC BLOOD PRESSURE: 110 MMHG | BODY MASS INDEX: 19.76 KG/M2 | DIASTOLIC BLOOD PRESSURE: 70 MMHG | WEIGHT: 118.6 LBS | HEIGHT: 65 IN

## 2023-07-26 DIAGNOSIS — M79.672 LEFT FOOT PAIN: ICD-10-CM

## 2023-07-26 DIAGNOSIS — S91.312D LACERATION OF LEFT FOOT, SUBSEQUENT ENCOUNTER: Primary | ICD-10-CM

## 2023-07-26 RX ORDER — CEPHALEXIN 500 MG/1
500 CAPSULE ORAL 2 TIMES DAILY
Qty: 14 CAPSULE | Refills: 0 | Status: SHIPPED | OUTPATIENT
Start: 2023-07-26 | End: 2023-08-02

## 2023-07-26 NOTE — PROGRESS NOTES
Established Patient Office Visit      Patient Name: Josephine Taylor  : 1990   MRN: [id number]   Care Team: Patient Care Team:  Faye Bee MD as PCP - General (Family Medicine)  Reyna Ordonez APRN as Nurse Practitioner (Gastroenterology)  Manpreet Santacruz MD as Consulting Physician (Pain Medicine)    Chief Complaint:    Chief Complaint   Patient presents with    Suture / Staple Removal     Suture removal bottom of left foot       History of Present Illness: Josephine Taylor is a 32 y.o. female who is here today for chief complaint.    Suture / Staple Removal    Presents for suture removal    This patient is accompanied by their self who contributes to the history of their care.    The following portions of the patient's history were reviewed and updated as appropriate: allergies, current medications, past family history, past medical history, past social history, past surgical history and problem list.    Subjective      Review of Systems:   Review of Systems - See HPI    Past Medical History:   Past Medical History:   Diagnosis Date    Anxiety     Arthritis     Back problem     Bronchitis     Chronic abdominal pain     Chronic nausea     Chronic pain disorder 2004    Colitis     CTS (carpal tunnel syndrome)     CYP2D6 poor metabolizer     Depression     Difficulty walking     Fibromyalgia, primary     Fractures rib 2007    Genitofemoral neuropathy     left    Headache, tension-type 2019    Insomnia     Joint pain 2014    Kidney infection     Low back pain 2014    Memory loss     Migraines     Neck pain 2019    Panic disorder     Postconcussion syndrome     PTSD (post-traumatic stress disorder)     Scoliosis     Traumatic brain injury 2019    Vitamin D deficiency        Past Surgical History:   Past Surgical History:   Procedure Laterality Date    ABDOMINAL SURGERY      coil renal?     BREAST BIOPSY Left 2009    bx and excisional    BREAST LUMPECTOMY       SECTION      ILIAC  ARTERY STENT  2022    TRIGGER POINT INJECTION      VASCULAR SURGERY  [id number]    Embolism       Family History:   Family History   Problem Relation Age of Onset    Anxiety disorder Mother     Hypertension Mother     Arthritis Mother     Mental illness Mother     Lupus Mother     Fibromyalgia Mother     Depression Father     COPD Father     Kidney disease Father     Heart disease Father     Heart attack Father     Mental illness Father     Heart failure Father     Alcohol abuse Father     Anxiety disorder Sister     Migraines Sister     Depression Sister     Depression Sister     Anxiety disorder Sister     ADD / ADHD Sister     Asthma Son     Melanoma Maternal Grandmother     Brain cancer Maternal Grandfather     Colon cancer Neg Hx     Colon polyps Neg Hx     Esophageal cancer Neg Hx        Social History:   Social History     Socioeconomic History    Marital status: Legally    Tobacco Use    Smoking status: Every Day     Packs/day: 0.50     Years: 15.00     Pack years: 7.50     Types: Cigarettes     Start date: 1/1/2005    Smokeless tobacco: Never   Vaping Use    Vaping Use: Never used   Substance and Sexual Activity    Alcohol use: Never    Drug use: Never     Types: Marijuana     Comment: Pt states that she smokes about a few times a week.    Sexual activity: Yes     Partners: Male     Birth control/protection: None       Tobacco History:   Social History     Tobacco Use   Smoking Status Every Day    Packs/day: 0.50    Years: 15.00    Pack years: 7.50    Types: Cigarettes    Start date: 1/1/2005   Smokeless Tobacco Never       Medications:     Current Outpatient Medications:     aspirin 81 MG chewable tablet, Chew Daily., Disp: , Rfl:     clonazePAM (KlonoPIN) 0.5 MG tablet, Take 1 tablet by mouth., Disp: , Rfl:     Nurtec 75 MG dispersible tablet, Take 1 tablet by mouth Every Other Day. (Patient taking differently: Take 1 tablet by mouth As Needed.), Disp: 16 tablet, Rfl: 5    ondansetron ODT  "(ZOFRAN-ODT) 4 MG disintegrating tablet, PLACE 1 TABLET ON THE TONGUE EVERY 6 (SIX) HOURS AS NEEDED FOR NAUSEA OR VOMITING., Disp: 120 tablet, Rfl: 0    Allergies:   Allergies   Allergen Reactions    Galcanezumab-Gnlm Shortness Of Breath    Aimovig [Erenumab-Aooe] Other (See Comments)     Told to avoid due to hives, SOA with Emgality    Gabapentin Dizziness    Sulfa Antibiotics Hives    Venlafaxine Other (See Comments)     Worsened headaches and severe mood changes    Ciprofloxacin Unknown - High Severity       Objective   Objective     Physical Exam:  Vital Signs:   Vitals:    07/26/23 1204   BP: 110/70   BP Location: Left arm   Patient Position: Sitting   Cuff Size: Adult   Weight: 53.8 kg (118 lb 9.6 oz)   Height: 165.1 cm (65\")     Body mass index is 19.74 kg/mý.     Physical Exam  Nursing note reviewed  Const: NAD, A&Ox4, Pleasant, Cooperative  Eyes: EOMI, no conjunctivitis  ENT: No nasal discharge present, neck supple  Cardiac: Regular rate and rhythm, no cyanosis  Resp: Respiratory rate within normal limits, no increased work of breathing, no audible wheezing or retractions noted  GI: No distention or ascites  MSK: Motor and sensation grossly intact in bilateral upper extremities  Neurologic: CN II-XII grossly intact  Psych: Appropriate mood and behavior.  Skin: Warm, dry  Procedures/Radiology     Suture Removal    Date/Time: 7/26/2023 12:18 PM  Performed by: Nilson Ordoñez DO  Authorized by: Nilson Ordoñez DO   Body area: lower extremity  Location details: left foot  Wound Appearance: clean  Sutures Removed: 6  Post-removal: dressing applied and antibiotic ointment applied  Patient tolerance: patient tolerated the procedure well with no immediate complications  Comments: Not tender at wound site, but severely tender in surrounding area at plantar surface      CT Lower Extremity Left With Contrast    Result Date: 8/8/2023  Impression: 1.Vague area of irregular increased density within the plantar " lateral midfoot subcutaneous tissues may represent soft tissue injury site. No radiodense foreign body identified. 2.No other abnormality identified. If symptoms persist, MRI might be useful. Electronically Signed: Dylan Doll MD  8/8/2023 1:24 PM CDT  Workstation ID: [id number]      Assessment & Plan   Assessment / Plan      Assessment/Plan:   Problems Addressed This Visit  Diagnoses and all orders for this visit:    1. Laceration of left foot, subsequent encounter (Primary)  Comments:  6 sutures removed without difficulty. Still exquisitely tender left heel, imaging to r/o abscess or occult fracture  Orders:  -     cephalexin (Keflex) 500 MG capsule; Take 1 capsule by mouth 2 (Two) Times a Day for 7 days.  Dispense: 14 capsule; Refill: 0  -     CT Lower Extremity Left With Contrast; Future  -     Suture Removal    2. Left foot pain  -     cephalexin (Keflex) 500 MG capsule; Take 1 capsule by mouth 2 (Two) Times a Day for 7 days.  Dispense: 14 capsule; Refill: 0  -     CT Lower Extremity Left With Contrast; Future      Problem List Items Addressed This Visit          Musculoskeletal and Injuries    Laceration of left foot - Primary    Relevant Orders    CT Lower Extremity Left With Contrast (Completed)    Suture Removal     Other Visit Diagnoses       Left foot pain        Relevant Orders    CT Lower Extremity Left With Contrast (Completed)            There are no Patient Instructions on file for this visit.    Follow Up:   No follow-ups on file.    DO LUCY Aguilera RD  [facility]  [address]  [facility] [address]  Fax [phone]  Phone [phone]

## 2023-08-08 ENCOUNTER — HOSPITAL ENCOUNTER (OUTPATIENT)
Dept: CT IMAGING | Facility: HOSPITAL | Age: 33
Discharge: HOME | End: 2023-08-08
Admitting: FAMILY MEDICINE
Payer: MEDICAID

## 2023-08-08 DIAGNOSIS — M79.672 LEFT FOOT PAIN: ICD-10-CM

## 2023-08-08 DIAGNOSIS — S91.312D LACERATION OF LEFT FOOT, SUBSEQUENT ENCOUNTER: ICD-10-CM

## 2023-08-08 PROCEDURE — 25510000001 IOPAMIDOL PER 1 ML: Performed by: FAMILY MEDICINE

## 2023-08-08 PROCEDURE — 73701 CT LOWER EXTREMITY W/DYE: CPT

## 2023-08-08 RX ADMIN — IOPAMIDOL 95 ML: 755 INJECTION, SOLUTION INTRAVENOUS at 14:07

## 2023-08-09 ENCOUNTER — PATIENT MESSAGE (OUTPATIENT)
Dept: FAMILY MEDICINE CLINIC | Facility: CLINIC | Age: 33
End: 2023-08-09
Payer: MEDICAID

## 2023-08-28 ENCOUNTER — PATIENT MESSAGE (OUTPATIENT)
Age: 33
End: 2023-08-28
Payer: MEDICAID

## 2023-08-30 ENCOUNTER — OFFICE VISIT (OUTPATIENT)
Age: 33
End: 2023-08-30
Payer: MEDICAID

## 2023-08-30 VITALS
HEIGHT: 65 IN | SYSTOLIC BLOOD PRESSURE: 104 MMHG | OXYGEN SATURATION: 95 % | HEART RATE: 75 BPM | WEIGHT: 112 LBS | BODY MASS INDEX: 18.66 KG/M2 | DIASTOLIC BLOOD PRESSURE: 62 MMHG

## 2023-08-30 DIAGNOSIS — R11.0 CHRONIC NAUSEA: ICD-10-CM

## 2023-08-30 DIAGNOSIS — M79.672 ACUTE PAIN OF LEFT FOOT: Primary | ICD-10-CM

## 2023-08-30 PROCEDURE — 99214 OFFICE O/P EST MOD 30 MIN: CPT | Performed by: FAMILY MEDICINE

## 2023-08-30 PROCEDURE — 1159F MED LIST DOCD IN RCRD: CPT | Performed by: FAMILY MEDICINE

## 2023-08-30 PROCEDURE — 1160F RVW MEDS BY RX/DR IN RCRD: CPT | Performed by: FAMILY MEDICINE

## 2023-08-30 RX ORDER — ONDANSETRON 4 MG/1
4 TABLET, ORALLY DISINTEGRATING ORAL EVERY 6 HOURS PRN
Qty: 120 TABLET | Refills: 3 | Status: SHIPPED | OUTPATIENT
Start: 2023-08-30

## 2023-08-30 NOTE — PROGRESS NOTES
"Chief Complaint  Foot Injury (Difficulty walking. Left foot. Left middle area. Stepped on glass 2 months ago. Had 6 stiches, they were removed about 1 month and a half after injury.)    Subjective        Josephine Taylor presents to [facility]  History of Present Illness    Broken aquarium in the floor, in the dark she tripped and fell and landed on top of piece of glass. She pulled glass and she had a gash in her left foot. She went to ED and had 6 stitches placed. She walks on all of her foot but can't bear weight on bottom of her foot. She tried a few steps but had nausea. Pins and needles with shooting pain up her foot. Pain in arch now.     Continues to have chronic nausea and takes zofran. She has pelvic congestion syndrome. Prior colonoscopy. Stent placement helped nausea.           Objective   Vital Signs:  /62   Pulse 75   Ht 165.1 cm (65\")   Wt 50.8 kg (112 lb)   SpO2 95%   BMI 18.64 kg/mý   Estimated body mass index is 18.64 kg/mý as calculated from the following:    Height as of this encounter: 165.1 cm (65\").    Weight as of this encounter: 50.8 kg (112 lb).       BMI is within normal parameters. No other follow-up for BMI required.      Physical Exam  Constitutional:       General: She is not in acute distress.     Appearance: She is not ill-appearing, toxic-appearing or diaphoretic.   Pulmonary:      Effort: No respiratory distress.   Musculoskeletal:        Feet:    Psychiatric:         Mood and Affect: Mood normal.      Result Review :        CT Lower Extremity Left With Contrast (08/08/2023 13:50)   Office Visit with Clem Lewis PA (07/21/2023)  Office Visit with Nilson Ordoñez DO (07/26/2023)         Assessment and Plan   Diagnoses and all orders for this visit:    1. Acute pain of left foot (Primary)  -     MRI Foot Left With Contrast; Future  -     Ambulatory Referral to Physical Therapy Evaluate and treat  Continued pain.  Scar is healing " well.  CT scan did not show evidence of abscess or fracture.  With continued pain further evaluation with MRI of soft tissue structures.  Begin physical therapy for rehab.  If abnormality on MRI and plan to refer to orthopedics.  2. Chronic nausea  -     ondansetron ODT (ZOFRAN-ODT) 4 MG disintegrating tablet; Place 1 tablet on the tongue Every 6 (Six) Hours As Needed for Nausea or Vomiting.  Dispense: 120 tablet; Refill: 3  -     Ambulatory Referral to Gastroenterology  Continue Zofran.  I would like her to establish care with gastroenterology for further evaluation and management.    Electronically signed by Faye Bee MD, 08/30/23, 9:57 AM EDT.         Follow Up   No follow-ups on file.  Patient was given instructions and counseling regarding her condition or for health maintenance advice. Please see specific information pulled into the AVS if appropriate.

## 2023-09-15 PROCEDURE — 99283 EMERGENCY DEPT VISIT LOW MDM: CPT

## 2023-09-15 RX ORDER — SODIUM CHLORIDE 0.9 % (FLUSH) 0.9 %
10 SYRINGE (ML) INJECTION AS NEEDED
Status: DISCONTINUED | OUTPATIENT
Start: 2023-09-15 | End: 2023-09-16 | Stop reason: HOSPADM

## 2023-09-15 RX ORDER — HALOPERIDOL 5 MG/ML
5 INJECTION INTRAMUSCULAR ONCE
Status: COMPLETED | OUTPATIENT
Start: 2023-09-16 | End: 2023-09-16

## 2023-09-16 ENCOUNTER — HOSPITAL ENCOUNTER (EMERGENCY)
Facility: HOSPITAL | Age: 33
Discharge: HOME | End: 2023-09-16
Attending: EMERGENCY MEDICINE
Payer: MEDICAID

## 2023-09-16 VITALS
DIASTOLIC BLOOD PRESSURE: 47 MMHG | WEIGHT: 112 LBS | TEMPERATURE: 98.3 F | RESPIRATION RATE: 18 BRPM | HEART RATE: 65 BPM | OXYGEN SATURATION: 93 % | SYSTOLIC BLOOD PRESSURE: 93 MMHG | BODY MASS INDEX: 18.66 KG/M2 | HEIGHT: 65 IN

## 2023-09-16 DIAGNOSIS — G43.009 MIGRAINE WITHOUT AURA AND WITHOUT STATUS MIGRAINOSUS, NOT INTRACTABLE: Primary | ICD-10-CM

## 2023-09-16 PROCEDURE — 96374 THER/PROPH/DIAG INJ IV PUSH: CPT

## 2023-09-16 PROCEDURE — 25010000002 HALOPERIDOL LACTATE PER 5 MG: Performed by: EMERGENCY MEDICINE

## 2023-09-16 RX ORDER — PROMETHAZINE HYDROCHLORIDE 25 MG/1
25 TABLET ORAL EVERY 8 HOURS PRN
Qty: 15 TABLET | Refills: 0 | Status: SHIPPED | OUTPATIENT
Start: 2023-09-16 | End: 2023-09-21

## 2023-09-16 RX ORDER — IBUPROFEN 600 MG/1
600 TABLET ORAL EVERY 8 HOURS PRN
Qty: 21 TABLET | Refills: 0 | Status: SHIPPED | OUTPATIENT
Start: 2023-09-16 | End: 2023-09-22

## 2023-09-16 RX ADMIN — SODIUM CHLORIDE 1000 ML: 9 INJECTION, SOLUTION INTRAVENOUS at 01:56

## 2023-09-16 RX ADMIN — HALOPERIDOL LACTATE 5 MG: 5 INJECTION, SOLUTION INTRAMUSCULAR at 01:56

## 2023-09-16 NOTE — ED PROVIDER NOTES
Subjective   History of Present Illness  This is a 32-year-old female with past medical history of postconcussive syndrome presenting to the emergency department with some headache.  The patient states that she has had multiple concussions throughout her life and she frequently gets tension type headaches.  She states that this morning started earlier today and has not light.  She is having some discomfort located in the bilateral temple areas.  She states that they are stabbing pains.  Consistent with her previous types of headaches.  Patient tried taking some medicine at home, however did not resolve.  She rates the headache a 9 out of 10.  Is worse by bright lights.  She denies any new head trauma.  She is not having any change in vision or focal weakness.  No neck pain.  No chest pain or shortness of breath.  No Abdominal pain or vomiting.    History provided by:  Patient   used: No      Review of Systems   Constitutional:  Negative for chills and fever.   HENT:  Negative for congestion, ear pain and sore throat.    Eyes:  Negative for visual disturbance.   Respiratory:  Negative for shortness of breath.    Cardiovascular:  Negative for chest pain.   Gastrointestinal:  Negative for abdominal pain.   Genitourinary:  Negative for difficulty urinating.   Musculoskeletal:  Negative for arthralgias.   Skin:  Negative for rash.   Neurological:  Positive for headaches. Negative for dizziness, weakness and numbness.   Psychiatric/Behavioral:  Negative for agitation.      Past Medical History:   Diagnosis Date    Anxiety     Arthritis     Back problem     Bronchitis     Chronic abdominal pain     Chronic nausea     Chronic pain disorder 2004    Colitis     CTS (carpal tunnel syndrome)     CYP2D6 poor metabolizer     Depression     Difficulty walking     Fibromyalgia, primary     Foot laceration 07/12/2023    No ligament/tendon damage    Fractures rib 2007    Genitofemoral neuropathy     left     Headache, tension-type 2019    Insomnia     Joint pain 2014    Kidney infection     Low back pain 2014    Memory loss     Migraines     Neck pain 2019    Panic disorder     Postconcussion syndrome     PTSD (post-traumatic stress disorder)     Scoliosis     Traumatic brain injury 2019    Vitamin D deficiency        Allergies   Allergen Reactions    Galcanezumab-Gnlm Shortness Of Breath    Aimovig [Erenumab-Aooe] Other (See Comments)     Told to avoid due to hives, SOA with Emgality    Gabapentin Dizziness    Sulfa Antibiotics Hives    Venlafaxine Other (See Comments)     Worsened headaches and severe mood changes    Ciprofloxacin Unknown - High Severity       Past Surgical History:   Procedure Laterality Date    ABDOMINAL SURGERY      coil renal?     BREAST BIOPSY Left 2009    bx and excisional    BREAST LUMPECTOMY       SECTION      ILIAC ARTERY STENT      TRIGGER POINT INJECTION      VASCULAR SURGERY  [id number]    Embolism       Family History   Problem Relation Age of Onset    Anxiety disorder Mother     Hypertension Mother     Arthritis Mother     Mental illness Mother     Lupus Mother     Fibromyalgia Mother     Depression Father     COPD Father     Kidney disease Father     Heart disease Father     Heart attack Father     Mental illness Father     Heart failure Father     Alcohol abuse Father     Anxiety disorder Sister     Migraines Sister     Depression Sister     Depression Sister     Anxiety disorder Sister     ADD / ADHD Sister     Asthma Son     Melanoma Maternal Grandmother     Brain cancer Maternal Grandfather     Colon cancer Neg Hx     Colon polyps Neg Hx     Esophageal cancer Neg Hx        Social History     Socioeconomic History    Marital status: Legally    Tobacco Use    Smoking status: Former     Packs/day: 0.50     Years: 15.00     Pack years: 7.50     Types: Cigarettes     Start date: 2005     Quit date: 2023     Years since quittin.2    Smokeless tobacco: Never    Vaping Use    Vaping Use: Every day    Substances: Nicotine, Flavoring   Substance and Sexual Activity    Alcohol use: Never    Drug use: Never     Types: Marijuana     Comment: Pt states that she smokes about a few times a week.    Sexual activity: Yes     Partners: Male     Birth control/protection: None           Objective   Physical Exam  Vitals and nursing note reviewed.   Constitutional:       General: She is not in acute distress.     Appearance: She is not ill-appearing or toxic-appearing.   HENT:      Mouth/Throat:      Pharynx: No posterior oropharyngeal erythema.   Eyes:      Conjunctiva/sclera: Conjunctivae normal.      Pupils: Pupils are equal, round, and reactive to light.   Cardiovascular:      Rate and Rhythm: Normal rate and regular rhythm.   Pulmonary:      Effort: Pulmonary effort is normal. No respiratory distress.   Abdominal:      General: Abdomen is flat. There is no distension.      Palpations: There is no mass.      Tenderness: There is no abdominal tenderness. There is no guarding or rebound.   Musculoskeletal:         General: No deformity. Normal range of motion.   Skin:     General: Skin is warm.      Findings: No rash.   Neurological:      General: No focal deficit present.      Mental Status: She is alert and oriented to person, place, and time.      Motor: No weakness.       Procedures           ED Course  ED Course as of 09/16/23 0325   Sat Sep 16, 2023   0323 BP: 94/46 [JK]   0323 Temp: 98.3 °F (36.8 °C) [JK]   0323 Temp src: Oral [JK]   0323 Heart Rate: 90 [JK]   0323 Resp: 18 [JK]   0323 SpO2: 94 % [JK]   0323 Device (Oxygen Therapy): room air  Patient's repeat vitals, telemetry tracing, and pulse oximetry tracing were directly viewed and interpreted by myself.  Patient hemodynamically stable [JK]   0324 On reevaluation, the patient's pain is improved. They are not having any worsening headache. There have been no episodes of witnessed vomiting in the Emergency Department. Given  the history and physical exam, the headache is not consistent with CVA, subarachnoid hemorrhage, acute intracranial catastrophe, meningitis, encephalitis, intracranial abscess. Neurologic exam is nonfocal. The patient improved with migraine treatment here. I did discuss further evaluation with lumbar puncture, however, patient declined at this time as the symptoms have significantly improved.      [JK]      ED Course User Index  [JK] Andrea Mcmahon MD                                           Medical Decision Making  This is a 32-year-old female presented to the emergency department with a headache.  The patient's symptoms seem most consistent with a migraine.  She has a longstanding history of migraines secondary to postconcussive syndrome.  Patient's neurologic exam appears normal at this time.  She does not have any deficits.  No meningismus.  Patient be provided symptomatic treatment.  Reevaluated.      Differential diagnosis: Migraine, tension headache, postconcussive syndrome      Amount and/or Complexity of Data Reviewed  External Data Reviewed: labs and notes.     Details: External laboratories, imaging as well as notes were reviewed personally by myself.  All relevant studies were used to guide decision making.     Date of previous record: 8/30/2023    Source of note: Primary care physician    Summary: Patient was seen and evaluated for routine visit as well as fibromyalgia and complex regional pain syndrome.  Did review basic laboratory studies and records.    Risk  Prescription drug management.        Final diagnoses:   Migraine without aura and without status migrainosus, not intractable       ED Disposition  ED Disposition       ED Disposition   Discharge    Condition   Stable    Comment   --               Faye Bee MD  [address]  [address] [address]  [phone]    Call in 1 day           Medication List        New Prescriptions      ibuprofen 600 MG tablet  Commonly  known as: ADVIL,MOTRIN  Take 1 tablet by mouth Every 8 (Eight) Hours As Needed for Mild Pain (for moderate severity pain).     promethazine 25 MG tablet  Commonly known as: PHENERGAN  Take 1 tablet by mouth Every 8 (Eight) Hours As Needed for Nausea or Vomiting for up to 5 days.               Where to Get Your Medications        These medications were sent to [id number] - [place], [place] - [address] [place] [address] - [phone]  - [phone] [address] [address]      Phone: [phone]   ibuprofen 600 MG tablet  promethazine 25 MG tablet            Andrea Mcmahon MD  09/16/23 0324

## 2023-09-22 ENCOUNTER — OFFICE VISIT (OUTPATIENT)
Dept: GASTROENTEROLOGY | Facility: CLINIC | Age: 33
End: 2023-09-22
Payer: MEDICAID

## 2023-09-22 VITALS
SYSTOLIC BLOOD PRESSURE: 110 MMHG | RESPIRATION RATE: 18 BRPM | TEMPERATURE: 97.8 F | DIASTOLIC BLOOD PRESSURE: 62 MMHG | OXYGEN SATURATION: 99 % | BODY MASS INDEX: 19.16 KG/M2 | WEIGHT: 115 LBS | HEART RATE: 78 BPM | HEIGHT: 65 IN

## 2023-09-22 DIAGNOSIS — R11.0 CHRONIC NAUSEA: Primary | ICD-10-CM

## 2023-09-22 DIAGNOSIS — K58.1 IRRITABLE BOWEL SYNDROME WITH CONSTIPATION: ICD-10-CM

## 2023-09-22 NOTE — PROGRESS NOTES
Follow Up      Patient Name: Josephine Taylor  : 1990   MRN: [id number]     Chief Complaint:    Chief Complaint   Patient presents with    Constipation    Nausea     Zofran every 6hrs x6yrs    LLQ Pain       History of Present Illness: Josephine Taylor is a 32 y.o. female, PMH includes migraines, anxiety, depression, scoliosis, who is here today for follow up on nausea, constipation.     Pt notes chronic nausea, for which she takes zofran 4mg tablets q 6h. She has done this long term, which provides temporary relief of sx. She smokes marijuana occasionally, but notes no improvement in sx frequency when abstaining from such. She denies specific aggravating foods or factors. She notes associated bloating and early satiety.    Pt notes chronic constipation, usually going a few days between large, complete BM. She reports associated LLQ abdominal pain that is alleviated with evacuation. She underwent stenting of iliac vein one year ago which has overall helped LLQ pain / nausea.     Patient denies associated fever, chills, indigestion, vomiting, diarrhea, hematemesis, dysphagia, hematochezia, melena, weight loss or gain, dysuria, jaundice or bruising.    CSY 2022 with Dr. Davidson. Good bowel prep conditions. Normal terminal ileum. Normal appearing colon mucosa. Nonbleeding internal hemorrhoids.     Subjective      Review of Systems:   Review of Systems   Constitutional:  Negative for appetite change, chills, diaphoresis, fatigue, fever, unexpected weight gain and unexpected weight loss.   HENT:  Negative for drooling, facial swelling, mouth sores, rhinorrhea, sore throat, tinnitus, trouble swallowing and voice change.    Eyes: Negative.    Respiratory:  Negative for cough, chest tightness and shortness of breath.    Cardiovascular:  Negative for chest pain.   Gastrointestinal:  Positive for abdominal pain (LLQ), constipation and nausea. Negative for blood in stool, diarrhea, vomiting, GERD and  indigestion.   Genitourinary:  Negative for dysuria, flank pain, hematuria and pelvic pain.   Musculoskeletal:  Negative for arthralgias and myalgias.   Skin:  Negative for color change, pallor and rash.   Neurological:  Negative for dizziness, tremors, syncope, weakness and numbness.   Psychiatric/Behavioral:  Negative for hallucinations and sleep disturbance. The patient is not nervous/anxious.    All other systems reviewed and are negative.    Medications:     Current Outpatient Medications:     clonazePAM (KlonoPIN) 0.5 MG tablet, Take 1 tablet by mouth., Disp: , Rfl:     ondansetron ODT (ZOFRAN-ODT) 4 MG disintegrating tablet, Place 1 tablet on the tongue Every 6 (Six) Hours As Needed for Nausea or Vomiting., Disp: 120 tablet, Rfl: 3    Nurtec 75 MG dispersible tablet, Take 1 tablet by mouth Every Other Day. (Patient not taking: Reported on 2023), Disp: 16 tablet, Rfl: 5    Allergies:   Allergies   Allergen Reactions    Galcanezumab-Gnlm Shortness Of Breath    Aimovig [Erenumab-Aooe] Other (See Comments)     Told to avoid due to hives, SOA with Emgality    Gabapentin Dizziness    Sulfa Antibiotics Hives    Venlafaxine Other (See Comments)     Worsened headaches and severe mood changes    Ciprofloxacin Unknown - High Severity       Social History:   Social History     Socioeconomic History    Marital status: Legally    Tobacco Use    Smoking status: Former     Packs/day: 0.50     Years: 15.00     Pack years: 7.50     Types: Cigarettes     Start date: 2005     Quit date: 2023     Years since quittin.2    Smokeless tobacco: Never   Vaping Use    Vaping Use: Every day    Substances: Nicotine, Flavoring   Substance and Sexual Activity    Alcohol use: Never    Drug use: Yes     Types: Marijuana     Comment: Pt states that she smokes about a few times a week.    Sexual activity: Yes     Partners: Male     Birth control/protection: None        Surgical History:   Past Surgical History:  "  Procedure Laterality Date    ABDOMINAL SURGERY      coil renal?     BREAST BIOPSY Left 2009    bx and excisional    BREAST LUMPECTOMY       SECTION      ILIAC ARTERY STENT      TRIGGER POINT INJECTION      VASCULAR SURGERY  [id number]    Embolism        Medical History:   Past Medical History:   Diagnosis Date    Anxiety     Arthritis     Back problem     Bronchitis     Chronic abdominal pain     Chronic nausea     Chronic pain disorder 2004    Colitis     CTS (carpal tunnel syndrome)     CYP2D6 poor metabolizer     Depression     Difficulty walking     Fibromyalgia, primary     Foot laceration 2023    No ligament/tendon damage    Fractures rib 2007    Genitofemoral neuropathy     left    Headache, tension-type 2019    Insomnia     Joint pain 2014    Kidney infection     Low back pain 2014    Memory loss     Migraines     Neck pain 2019    Panic disorder     Postconcussion syndrome     PTSD (post-traumatic stress disorder)     Scoliosis     Traumatic brain injury 2019    Vitamin D deficiency         Objective     Physical Exam:  Vital Signs:   Vitals:    23 1030   BP: 110/62   Pulse: 78   Resp: 18   Temp: 97.8 °F (36.6 °C)   TempSrc: Temporal   SpO2: 99%   Weight: 52.2 kg (115 lb)   Height: 165.1 cm (65\")     Body mass index is 19.14 kg/m².     Physical Exam  Vitals and nursing note reviewed.   Constitutional:       Appearance: Normal appearance. She is normal weight. She is not ill-appearing or diaphoretic.      Comments: Pleasantly conversant. BMI 19.14.    HENT:      Head: Normocephalic and atraumatic.      Right Ear: External ear normal.      Left Ear: External ear normal.      Nose: Nose normal.      Mouth/Throat:      Mouth: Mucous membranes are moist.      Pharynx: Oropharynx is clear.   Eyes:      Conjunctiva/sclera: Conjunctivae normal.      Pupils: Pupils are equal, round, and reactive to light.   Neck:      Thyroid: No thyromegaly.   Cardiovascular:      Rate and Rhythm: Normal " rate and regular rhythm.      Pulses: Normal pulses.      Heart sounds: Normal heart sounds.   Pulmonary:      Effort: Pulmonary effort is normal.      Breath sounds: Normal breath sounds.   Abdominal:      General: Abdomen is flat. Bowel sounds are normal. There is no distension.      Tenderness: There is abdominal tenderness (mild, LLQ).   Musculoskeletal:         General: Normal range of motion.      Cervical back: Normal range of motion and neck supple.   Skin:     General: Skin is warm and dry.   Neurological:      General: No focal deficit present.      Mental Status: She is oriented to person, place, and time.   Psychiatric:         Mood and Affect: Mood normal.       Assessment / Plan      Assessment/Plan:   There are no diagnoses linked to this encounter.     IBS-C  Chronic nausea   - pt given samples for Trulance 3mg daily, advised to call clinic in 1 week with sx update   - pt advised to use zofran sparingly, as this is likely contributing to her constipation sx    - pt advised to use rosie root 500mg TID   - previous labs, imaging, endoscopy reports reviewed   - obtain 4h GES   - follow up in clinic in 6-8 weeks, or after completion of above studies   - call clinic at any time for questions or new / worsened sx      Follow Up:   Return in about 6 weeks (around 11/3/2023).    Plan of care reviewed with the patient at the conclusion of today's visit.  Education was provided regarding diagnosis, management, and any prescribed or recommended OTC medications.  Patient verbalized understanding of and agreement with management plan.     NOTE TO PATIENT: The 21st Century Cures Act makes medical notes like these available to patients in the interest of transparency. However, be advised this is a medical document. It is intended as peer to peer communication. It is written in medical language and may contain abbreviations or verbiage that are unfamiliar. It may appear blunt or direct. Medical documents are  intended to carry relevant information, facts as evident, and the clinical opinion of the practitioner.     Time Statement:   Discussed plan of care in detail with patient today. Patient verbally understands and agrees. I have spent 30 minutes reviewing available diagnostics, obtaining history, examining the patient, developing a treatment plan, and educating the patient on disease process and plan of care.     Marita Porter PA-C   [facility]

## 2023-09-25 ENCOUNTER — TELEPHONE (OUTPATIENT)
Dept: GASTROENTEROLOGY | Facility: CLINIC | Age: 33
End: 2023-09-25

## 2023-09-25 NOTE — TELEPHONE ENCOUNTER
Attempted to contact patient with schedule update for Gastric Emptying Study that was ordered by Marita Porter. No answer, LVM.

## 2023-10-06 ENCOUNTER — PATIENT MESSAGE (OUTPATIENT)
Age: 33
End: 2023-10-06
Payer: MEDICAID

## 2023-12-12 ENCOUNTER — LAB (OUTPATIENT)
Age: 33
End: 2023-12-12
Payer: MEDICAID

## 2023-12-12 ENCOUNTER — OFFICE VISIT (OUTPATIENT)
Age: 33
End: 2023-12-12
Payer: MEDICAID

## 2023-12-12 VITALS
BODY MASS INDEX: 19.84 KG/M2 | HEIGHT: 65 IN | WEIGHT: 119.1 LBS | DIASTOLIC BLOOD PRESSURE: 70 MMHG | SYSTOLIC BLOOD PRESSURE: 106 MMHG

## 2023-12-12 DIAGNOSIS — Z11.3 ROUTINE SCREENING FOR STI (SEXUALLY TRANSMITTED INFECTION): ICD-10-CM

## 2023-12-12 DIAGNOSIS — L73.9 FOLLICULITIS: ICD-10-CM

## 2023-12-12 DIAGNOSIS — Z11.3 SCREENING EXAMINATION FOR VENEREAL DISEASE: ICD-10-CM

## 2023-12-12 DIAGNOSIS — L01.02 FOLLICULITIS AND PERIFOLLICULITIS: Primary | ICD-10-CM

## 2023-12-12 DIAGNOSIS — L73.9 FOLLICULITIS AND PERIFOLLICULITIS: Primary | ICD-10-CM

## 2023-12-12 DIAGNOSIS — R59.1 LYMPHADENOPATHY: ICD-10-CM

## 2023-12-12 DIAGNOSIS — L73.9 FOLLICULITIS: Primary | ICD-10-CM

## 2023-12-12 DIAGNOSIS — N89.8 VAGINAL DISCHARGE: ICD-10-CM

## 2023-12-12 LAB
BASOPHILS # BLD AUTO: 0.04 10*3/MM3 (ref 0–0.2)
BASOPHILS NFR BLD AUTO: 0.8 % (ref 0–1.5)
DEPRECATED RDW RBC AUTO: 40.6 FL (ref 37–54)
EOSINOPHIL # BLD AUTO: 0.15 10*3/MM3 (ref 0–0.4)
EOSINOPHIL NFR BLD AUTO: 2.9 % (ref 0.3–6.2)
ERYTHROCYTE [DISTWIDTH] IN BLOOD BY AUTOMATED COUNT: 11.7 % (ref 12.3–15.4)
HCT VFR BLD AUTO: 38.8 % (ref 34–46.6)
HGB BLD-MCNC: 13.4 G/DL (ref 12–15.9)
HIV 1+2 AB+HIV1 P24 AG SERPL QL IA: NORMAL
IMM GRANULOCYTES # BLD AUTO: 0.02 10*3/MM3 (ref 0–0.05)
IMM GRANULOCYTES NFR BLD AUTO: 0.4 % (ref 0–0.5)
LYMPHOCYTES # BLD AUTO: 1.86 10*3/MM3 (ref 0.7–3.1)
LYMPHOCYTES NFR BLD AUTO: 35.6 % (ref 19.6–45.3)
MCH RBC QN AUTO: 33 PG (ref 26.6–33)
MCHC RBC AUTO-ENTMCNC: 34.5 G/DL (ref 31.5–35.7)
MCV RBC AUTO: 95.6 FL (ref 79–97)
MONOCYTES # BLD AUTO: 0.4 10*3/MM3 (ref 0.1–0.9)
MONOCYTES NFR BLD AUTO: 7.7 % (ref 5–12)
NEUTROPHILS NFR BLD AUTO: 2.75 10*3/MM3 (ref 1.7–7)
NEUTROPHILS NFR BLD AUTO: 52.6 % (ref 42.7–76)
NRBC BLD AUTO-RTO: 0 /100 WBC (ref 0–0.2)
PLATELET # BLD AUTO: 224 10*3/MM3 (ref 140–450)
PMV BLD AUTO: 11.5 FL (ref 6–12)
RBC # BLD AUTO: 4.06 10*6/MM3 (ref 3.77–5.28)
WBC NRBC COR # BLD AUTO: 5.22 10*3/MM3 (ref 3.4–10.8)

## 2023-12-12 PROCEDURE — 1159F MED LIST DOCD IN RCRD: CPT | Performed by: FAMILY MEDICINE

## 2023-12-12 PROCEDURE — G0432 EIA HIV-1/HIV-2 SCREEN: HCPCS | Performed by: FAMILY MEDICINE

## 2023-12-12 PROCEDURE — 36415 COLL VENOUS BLD VENIPUNCTURE: CPT | Performed by: FAMILY MEDICINE

## 2023-12-12 PROCEDURE — 1160F RVW MEDS BY RX/DR IN RCRD: CPT | Performed by: FAMILY MEDICINE

## 2023-12-12 PROCEDURE — 99214 OFFICE O/P EST MOD 30 MIN: CPT | Performed by: FAMILY MEDICINE

## 2023-12-12 PROCEDURE — 85025 COMPLETE CBC W/AUTO DIFF WBC: CPT | Performed by: FAMILY MEDICINE

## 2023-12-12 RX ORDER — CEPHALEXIN 500 MG/1
500 CAPSULE ORAL 2 TIMES DAILY
Qty: 14 CAPSULE | Refills: 0 | Status: SHIPPED | OUTPATIENT
Start: 2023-12-12 | End: 2023-12-19

## 2023-12-12 NOTE — PROGRESS NOTES
"Chief Complaint  Groin Swelling (Swells and goes back down, for about the last year. Last week swollen non-stop, super sensitive to touch. //Also, spot as came up on right labia.)    Subjective        Josephine Taylor presents to [facility]  History of Present Illness    She has recurrent swelling in right groin like a bean. Protruding. Painful to touch. Left side is normal.  A couple days ago right labial lesion recurrent multiple time and diagnosed as stress ulcer. Associated with itching and looks like a boil. Yesterday vaginal discharge is white and creamy.         Objective   Vital Signs:  /70   Ht 165.1 cm (65\")   Wt 54 kg (119 lb 1.6 oz)   BMI 19.82 kg/m²   Estimated body mass index is 19.82 kg/m² as calculated from the following:    Height as of this encounter: 165.1 cm (65\").    Weight as of this encounter: 54 kg (119 lb 1.6 oz).       BMI is within normal parameters. No other follow-up for BMI required.      Physical Exam  Constitutional:       General: She is not in acute distress.     Appearance: She is not ill-appearing.   Genitourinary:     Exam position: Lithotomy position.      Labia:         Right: Lesion present.         Left: No lesion.       Vagina: Vaginal discharge (thin, white, scant) present. No erythema or tenderness.       Lymphadenopathy:      Lower Body: Right inguinal adenopathy (multiple shotty nodes) present. No left inguinal adenopathy.        Result Review :                   Assessment and Plan   Diagnoses and all orders for this visit:    1. Folliculitis (Primary)  -     cephalexin (Keflex) 500 MG capsule; Take 1 capsule by mouth 2 (Two) Times a Day for 7 days.  Dispense: 14 capsule; Refill: 0  -     CBC Auto Differential; Future  New.  Start Keflex.  Recommend sitz bath's and warm compress.  2. Vaginal discharge  -     NuSwab VG+ - Swab, Vagina; Future  -     NuSwab VG+ - Swab, Vagina  Further evaluation with testing.  She has a prior " history of STI.  3. Routine screening for STI (sexually transmitted infection)  -     HIV-1 / O / 2 Ag / Antibody; Future    4. Lymphadenopathy  -     CBC Auto Differential; Future  -     HIV-1 / O / 2 Ag / Antibody; Future  New onset lymphadenopathy preceded current folliculitis.  Check labs.           Follow Up   Return if symptoms worsen or fail to improve.  Patient was given instructions and counseling regarding her condition or for health maintenance advice. Please see specific information pulled into the AVS if appropriate.     Electronically signed by Faye Bee MD, 12/12/23, 11:43 AM EST.

## 2023-12-15 LAB
A VAGINAE DNA VAG QL NAA+PROBE: NORMAL SCORE
BVAB2 DNA VAG QL NAA+PROBE: NORMAL SCORE
C ALBICANS DNA VAG QL NAA+PROBE: NEGATIVE
C GLABRATA DNA VAG QL NAA+PROBE: NEGATIVE
C TRACH DNA VAG QL NAA+PROBE: NEGATIVE
MEGA1 DNA VAG QL NAA+PROBE: NORMAL SCORE
N GONORRHOEA DNA VAG QL NAA+PROBE: NEGATIVE
T VAGINALIS DNA VAG QL NAA+PROBE: NEGATIVE

## 2024-01-03 ENCOUNTER — PATIENT MESSAGE (OUTPATIENT)
Age: 34
End: 2024-01-03
Payer: MEDICAID

## 2024-01-08 ENCOUNTER — TRANSCRIBE ORDERS (OUTPATIENT)
Dept: ADMINISTRATIVE | Facility: HOSPITAL | Age: 34
End: 2024-01-08
Payer: MEDICAID

## 2024-01-08 DIAGNOSIS — N63.22 UNSPECIFIED LUMP IN THE LEFT BREAST, UPPER INNER QUADRANT: Primary | ICD-10-CM

## 2024-01-10 ENCOUNTER — PATIENT MESSAGE (OUTPATIENT)
Age: 34
End: 2024-01-10
Payer: MEDICAID

## 2024-01-15 ENCOUNTER — OFFICE VISIT (OUTPATIENT)
Age: 34
End: 2024-01-15
Payer: MEDICAID

## 2024-01-15 VITALS
BODY MASS INDEX: 20.19 KG/M2 | SYSTOLIC BLOOD PRESSURE: 112 MMHG | WEIGHT: 121.2 LBS | HEART RATE: 69 BPM | HEIGHT: 65 IN | OXYGEN SATURATION: 96 % | DIASTOLIC BLOOD PRESSURE: 64 MMHG

## 2024-01-15 DIAGNOSIS — R59.1 LYMPHADENOPATHY: Primary | ICD-10-CM

## 2024-01-15 PROCEDURE — 99213 OFFICE O/P EST LOW 20 MIN: CPT | Performed by: FAMILY MEDICINE

## 2024-01-15 PROCEDURE — 1159F MED LIST DOCD IN RCRD: CPT | Performed by: FAMILY MEDICINE

## 2024-01-15 PROCEDURE — 1160F RVW MEDS BY RX/DR IN RCRD: CPT | Performed by: FAMILY MEDICINE

## 2024-01-15 NOTE — PROGRESS NOTES
"Chief Complaint  Adenopathy (Painful and swollen on right side/On and off for 1 year and getting worse /Finished antibiotic but it didn't help )    Subjective        Josephine Taylor presents to [facility]  Adenopathy    Answers submitted by the patient for this visit:  Other (Submitted on 1/14/2024)  Please describe your symptoms.: Swollen, painful lymph nodes in groin. Right side  Have you had these symptoms before?: Yes  How long have you been having these symptoms?: Greater than 2 weeks  Please list any medications you are currently taking for this condition.: None  Primary Reason for Visit (Submitted on 1/14/2024)  What is the primary reason for your visit?: Other  At her most recent office visit on 12/12/2023 she was treated for folliculitis and lymphadenopathy with Keflex.  She also had testing performed with labs and workup for STI which were normal. Right side lymph nodes in groin. Slight increase in size and painful. She continues to have some redness on labia but wound has scabbed over and healed.     Two years ago she was at ED  7/9/23 for labial abscess/ bartholin gland and gonorrhea.     Around age 18 in [id number] she had renal coil embolization to her renal vein. She was diagnosed pelvic congestion syndrome and then 3 other diagnoses. She was told she had a cyst there.       Objective   Vital Signs:  /64   Pulse 69   Ht 165.1 cm (65\")   Wt 55 kg (121 lb 3.2 oz)   SpO2 96%   BMI 20.17 kg/m²   Estimated body mass index is 20.17 kg/m² as calculated from the following:    Height as of this encounter: 165.1 cm (65\").    Weight as of this encounter: 55 kg (121 lb 3.2 oz).       BMI is within normal parameters. No other follow-up for BMI required.      Physical Exam  Constitutional:       Appearance: She is not ill-appearing, toxic-appearing or diaphoretic.   Genitourinary:      Lymphadenopathy:      Lower Body: Right inguinal adenopathy (shotty, multiple) " present.        Result Review :        Neisseria gonorrhea DNA by PCR (07/09/2022 11:04)   CT Abdomen Pelvis With Contrast (10/04/2022 23:06)          Assessment and Plan   Diagnoses and all orders for this visit:    1. Lymphadenopathy (Primary)    Stable shotty lymph nodes. No need for additional antibiotics at this time w/o erythema or drainage. If doubling in size next evaluation to include ultrasound.          Follow Up   Return if symptoms worsen or fail to improve.  Patient was given instructions and counseling regarding her condition or for health maintenance advice. Please see specific information pulled into the AVS if appropriate.     Electronically signed by Faye Bee MD, 01/15/24, 10:56 AM EST.

## 2024-01-29 ENCOUNTER — HOSPITAL ENCOUNTER (OUTPATIENT)
Dept: ULTRASOUND IMAGING | Facility: HOSPITAL | Age: 34
Discharge: HOME | End: 2024-01-29
Payer: MEDICAID

## 2024-01-29 ENCOUNTER — HOSPITAL ENCOUNTER (OUTPATIENT)
Dept: MAMMOGRAPHY | Facility: HOSPITAL | Age: 34
Discharge: HOME | End: 2024-01-29
Payer: MEDICAID

## 2024-01-29 ENCOUNTER — TELEPHONE (OUTPATIENT)
Dept: MAMMOGRAPHY | Facility: HOSPITAL | Age: 34
End: 2024-01-29
Payer: MEDICAID

## 2024-01-29 DIAGNOSIS — N63.22 UNSPECIFIED LUMP IN THE LEFT BREAST, UPPER INNER QUADRANT: ICD-10-CM

## 2024-01-29 PROCEDURE — 76642 ULTRASOUND BREAST LIMITED: CPT | Performed by: RADIOLOGY

## 2024-01-29 PROCEDURE — 77066 DX MAMMO INCL CAD BI: CPT

## 2024-01-29 PROCEDURE — 77066 DX MAMMO INCL CAD BI: CPT | Performed by: RADIOLOGY

## 2024-01-29 PROCEDURE — 76642 ULTRASOUND BREAST LIMITED: CPT

## 2024-01-29 PROCEDURE — 77062 BREAST TOMOSYNTHESIS BI: CPT | Performed by: RADIOLOGY

## 2024-01-29 PROCEDURE — G0279 TOMOSYNTHESIS, MAMMO: HCPCS

## 2024-01-29 NOTE — TELEPHONE ENCOUNTER
Patient notified of surgical consult appointment with Dr KAMILLA Holm on Thursday 2/15/24 @ 11:00, arrival 10:45. Patient given office contact & location information. Told to bring photo ID, list of prescription & OTC medications, insurance information. May be accompanied by family member or friend for support. Encouraged patient to call back or contact surgeon's office with further questions. Patient verbalized understanding.

## 2024-02-05 ENCOUNTER — TRANSCRIBE ORDERS (OUTPATIENT)
Dept: ADMINISTRATIVE | Facility: HOSPITAL | Age: 34
End: 2024-02-05
Payer: MEDICAID

## 2024-02-05 DIAGNOSIS — R92.8 ABNORMAL MAMMOGRAM: Primary | ICD-10-CM

## 2024-02-09 ENCOUNTER — PATIENT MESSAGE (OUTPATIENT)
Age: 34
End: 2024-02-09
Payer: MEDICAID

## 2024-02-15 ENCOUNTER — TRANSCRIBE ORDERS (OUTPATIENT)
Dept: ADMINISTRATIVE | Facility: HOSPITAL | Age: 34
End: 2024-02-15
Payer: MEDICAID

## 2024-02-15 DIAGNOSIS — N63.22 UNSPECIFIED LUMP IN THE LEFT BREAST, UPPER INNER QUADRANT: Primary | ICD-10-CM

## 2024-02-23 ENCOUNTER — LAB (OUTPATIENT)
Dept: LAB | Facility: HOSPITAL | Age: 34
End: 2024-02-23
Payer: MEDICAID

## 2024-02-23 ENCOUNTER — TRANSCRIBE ORDERS (OUTPATIENT)
Dept: LAB | Facility: HOSPITAL | Age: 34
End: 2024-02-23
Payer: MEDICAID

## 2024-02-23 DIAGNOSIS — N63.22 BREAST LUMP ON LEFT SIDE AT 10 O'CLOCK POSITION: Primary | ICD-10-CM

## 2024-02-23 DIAGNOSIS — R92.8 ABNORMAL MAMMOGRAM: ICD-10-CM

## 2024-02-23 DIAGNOSIS — N63.22 BREAST LUMP ON LEFT SIDE AT 10 O'CLOCK POSITION: ICD-10-CM

## 2024-02-23 LAB
BASOPHILS # BLD AUTO: 0.03 10*3/MM3 (ref 0–0.2)
BASOPHILS NFR BLD AUTO: 0.7 % (ref 0–1.5)
DEPRECATED RDW RBC AUTO: 39.7 FL (ref 37–54)
EOSINOPHIL # BLD AUTO: 0.1 10*3/MM3 (ref 0–0.4)
EOSINOPHIL NFR BLD AUTO: 2.3 % (ref 0.3–6.2)
ERYTHROCYTE [DISTWIDTH] IN BLOOD BY AUTOMATED COUNT: 11.5 % (ref 12.3–15.4)
HCT VFR BLD AUTO: 40.3 % (ref 34–46.6)
HGB BLD-MCNC: 13.7 G/DL (ref 12–15.9)
IMM GRANULOCYTES # BLD AUTO: 0.01 10*3/MM3 (ref 0–0.05)
IMM GRANULOCYTES NFR BLD AUTO: 0.2 % (ref 0–0.5)
LYMPHOCYTES # BLD AUTO: 1.55 10*3/MM3 (ref 0.7–3.1)
LYMPHOCYTES NFR BLD AUTO: 35.7 % (ref 19.6–45.3)
MCH RBC QN AUTO: 32.3 PG (ref 26.6–33)
MCHC RBC AUTO-ENTMCNC: 34 G/DL (ref 31.5–35.7)
MCV RBC AUTO: 95 FL (ref 79–97)
MONOCYTES # BLD AUTO: 0.24 10*3/MM3 (ref 0.1–0.9)
MONOCYTES NFR BLD AUTO: 5.5 % (ref 5–12)
NEUTROPHILS NFR BLD AUTO: 2.41 10*3/MM3 (ref 1.7–7)
NEUTROPHILS NFR BLD AUTO: 55.6 % (ref 42.7–76)
NRBC BLD AUTO-RTO: 0 /100 WBC (ref 0–0.2)
PLATELET # BLD AUTO: 212 10*3/MM3 (ref 140–450)
PMV BLD AUTO: 11.3 FL (ref 6–12)
RBC # BLD AUTO: 4.24 10*6/MM3 (ref 3.77–5.28)
WBC NRBC COR # BLD AUTO: 4.34 10*3/MM3 (ref 3.4–10.8)

## 2024-02-23 PROCEDURE — 85025 COMPLETE CBC W/AUTO DIFF WBC: CPT

## 2024-02-23 PROCEDURE — 36415 COLL VENOUS BLD VENIPUNCTURE: CPT

## 2024-02-23 PROCEDURE — 80048 BASIC METABOLIC PNL TOTAL CA: CPT

## 2024-02-24 LAB
ANION GAP SERPL CALCULATED.3IONS-SCNC: 10 MMOL/L (ref 5–15)
BUN SERPL-MCNC: 8 MG/DL (ref 6–20)
BUN/CREAT SERPL: 10.3 (ref 7–25)
CALCIUM SPEC-SCNC: 9.3 MG/DL (ref 8.6–10.5)
CHLORIDE SERPL-SCNC: 109 MMOL/L (ref 98–107)
CO2 SERPL-SCNC: 25 MMOL/L (ref 22–29)
CREAT SERPL-MCNC: 0.78 MG/DL (ref 0.57–1)
EGFRCR SERPLBLD CKD-EPI 2021: 103 ML/MIN/1.73
GLUCOSE SERPL-MCNC: 81 MG/DL (ref 65–99)
POTASSIUM SERPL-SCNC: 4 MMOL/L (ref 3.5–5.2)
SODIUM SERPL-SCNC: 144 MMOL/L (ref 136–145)

## 2024-03-01 ENCOUNTER — HOSPITAL ENCOUNTER (OUTPATIENT)
Dept: ULTRASOUND IMAGING | Facility: HOSPITAL | Age: 34
Discharge: HOME | End: 2024-03-01
Payer: MEDICAID

## 2024-03-01 ENCOUNTER — HOSPITAL ENCOUNTER (OUTPATIENT)
Dept: MAMMOGRAPHY | Facility: HOSPITAL | Age: 34
Discharge: HOME | End: 2024-03-01
Payer: MEDICAID

## 2024-03-01 ENCOUNTER — LAB REQUISITION (OUTPATIENT)
Dept: LAB | Facility: HOSPITAL | Age: 34
End: 2024-03-01
Payer: MEDICAID

## 2024-03-01 DIAGNOSIS — R92.8 ABNORMAL MAMMOGRAM: ICD-10-CM

## 2024-03-01 DIAGNOSIS — N63.22 UNSPECIFIED LUMP IN THE LEFT BREAST, UPPER INNER QUADRANT: ICD-10-CM

## 2024-03-01 PROCEDURE — C1819 TISSUE LOCALIZATION-EXCISION: HCPCS

## 2024-03-01 PROCEDURE — 76098 X-RAY EXAM SURGICAL SPECIMEN: CPT

## 2024-03-01 PROCEDURE — 25010000002 LIDOCAINE 1 % SOLUTION: Performed by: RADIOLOGY

## 2024-03-01 PROCEDURE — 88305 TISSUE EXAM BY PATHOLOGIST: CPT | Performed by: SURGERY

## 2024-03-01 RX ORDER — LIDOCAINE HYDROCHLORIDE 10 MG/ML
5 INJECTION, SOLUTION INFILTRATION; PERINEURAL ONCE
Status: COMPLETED | OUTPATIENT
Start: 2024-03-01 | End: 2024-03-01

## 2024-03-01 RX ADMIN — Medication 5 ML: at 10:16

## 2024-03-04 LAB
CYTO UR: NORMAL
LAB AP CASE REPORT: NORMAL
LAB AP CLINICAL INFORMATION: NORMAL
PATH REPORT.FINAL DX SPEC: NORMAL
PATH REPORT.GROSS SPEC: NORMAL

## 2024-03-08 ENCOUNTER — TRANSCRIBE ORDERS (OUTPATIENT)
Dept: ADMINISTRATIVE | Facility: HOSPITAL | Age: 34
End: 2024-03-08
Payer: MEDICAID

## 2024-03-08 DIAGNOSIS — R92.8 ABNORMAL MAMMOGRAM: Primary | ICD-10-CM

## 2024-04-02 ENCOUNTER — PATIENT MESSAGE (OUTPATIENT)
Age: 34
End: 2024-04-02
Payer: MEDICAID

## 2024-04-02 DIAGNOSIS — R11.0 CHRONIC NAUSEA: Primary | ICD-10-CM

## 2024-04-09 ENCOUNTER — PATIENT MESSAGE (OUTPATIENT)
Age: 34
End: 2024-04-09
Payer: MEDICAID

## 2024-04-11 NOTE — TELEPHONE ENCOUNTER
Re-faxed the oral supplements prescription to [facility] [phone]

## 2024-06-05 ENCOUNTER — HOSPITAL ENCOUNTER (EMERGENCY)
Facility: HOSPITAL | Age: 34
Discharge: HOME | End: 2024-06-05
Attending: EMERGENCY MEDICINE | Admitting: EMERGENCY MEDICINE
Payer: MEDICAID

## 2024-06-05 ENCOUNTER — PATIENT MESSAGE (OUTPATIENT)
Age: 34
End: 2024-06-05
Payer: MEDICAID

## 2024-06-05 VITALS
DIASTOLIC BLOOD PRESSURE: 60 MMHG | HEART RATE: 75 BPM | TEMPERATURE: 98 F | SYSTOLIC BLOOD PRESSURE: 96 MMHG | HEIGHT: 65 IN | BODY MASS INDEX: 18.33 KG/M2 | OXYGEN SATURATION: 94 % | RESPIRATION RATE: 16 BRPM | WEIGHT: 110 LBS

## 2024-06-05 DIAGNOSIS — M54.9 ACUTE BILATERAL BACK PAIN, UNSPECIFIED BACK LOCATION: Primary | ICD-10-CM

## 2024-06-05 LAB
ALBUMIN SERPL-MCNC: 4.3 G/DL (ref 3.5–5.2)
ALBUMIN/GLOB SERPL: 1.4 G/DL
ALP SERPL-CCNC: 67 U/L (ref 39–117)
ALT SERPL W P-5'-P-CCNC: 23 U/L (ref 1–33)
AMPHET+METHAMPHET UR QL: NEGATIVE
AMPHETAMINES UR QL: NEGATIVE
ANION GAP SERPL CALCULATED.3IONS-SCNC: 10 MMOL/L (ref 5–15)
AST SERPL-CCNC: 24 U/L (ref 1–32)
B-HCG UR QL: NEGATIVE
BARBITURATES UR QL SCN: NEGATIVE
BASOPHILS # BLD AUTO: 0.03 10*3/MM3 (ref 0–0.2)
BASOPHILS NFR BLD AUTO: 0.7 % (ref 0–1.5)
BENZODIAZ UR QL SCN: NEGATIVE
BILIRUB SERPL-MCNC: 0.4 MG/DL (ref 0–1.2)
BILIRUB UR QL STRIP: NEGATIVE
BUN SERPL-MCNC: 6 MG/DL (ref 6–20)
BUN/CREAT SERPL: 10.3 (ref 7–25)
BUPRENORPHINE SERPL-MCNC: NEGATIVE NG/ML
CALCIUM SPEC-SCNC: 9.1 MG/DL (ref 8.6–10.5)
CANNABINOIDS SERPL QL: POSITIVE
CHLORIDE SERPL-SCNC: 105 MMOL/L (ref 98–107)
CK SERPL-CCNC: 63 U/L (ref 20–180)
CLARITY UR: CLEAR
CO2 SERPL-SCNC: 25 MMOL/L (ref 22–29)
COCAINE UR QL: NEGATIVE
COLOR UR: YELLOW
CREAT SERPL-MCNC: 0.58 MG/DL (ref 0.57–1)
DEPRECATED RDW RBC AUTO: 41.6 FL (ref 37–54)
EGFRCR SERPLBLD CKD-EPI 2021: 122.7 ML/MIN/1.73
EOSINOPHIL # BLD AUTO: 0.1 10*3/MM3 (ref 0–0.4)
EOSINOPHIL NFR BLD AUTO: 2.3 % (ref 0.3–6.2)
ERYTHROCYTE [DISTWIDTH] IN BLOOD BY AUTOMATED COUNT: 11.7 % (ref 12.3–15.4)
EXPIRATION DATE: NORMAL
FENTANYL UR-MCNC: NEGATIVE NG/ML
GLOBULIN UR ELPH-MCNC: 3 GM/DL
GLUCOSE SERPL-MCNC: 86 MG/DL (ref 65–99)
GLUCOSE UR STRIP-MCNC: NEGATIVE MG/DL
HCT VFR BLD AUTO: 42.5 % (ref 34–46.6)
HGB BLD-MCNC: 13.9 G/DL (ref 12–15.9)
HGB UR QL STRIP.AUTO: NEGATIVE
IMM GRANULOCYTES # BLD AUTO: 0.01 10*3/MM3 (ref 0–0.05)
IMM GRANULOCYTES NFR BLD AUTO: 0.2 % (ref 0–0.5)
INTERNAL NEGATIVE CONTROL: NEGATIVE
INTERNAL POSITIVE CONTROL: POSITIVE
KETONES UR QL STRIP: NEGATIVE
LEUKOCYTE ESTERASE UR QL STRIP.AUTO: NEGATIVE
LYMPHOCYTES # BLD AUTO: 1.84 10*3/MM3 (ref 0.7–3.1)
LYMPHOCYTES NFR BLD AUTO: 41.7 % (ref 19.6–45.3)
Lab: NORMAL
MCH RBC QN AUTO: 31.7 PG (ref 26.6–33)
MCHC RBC AUTO-ENTMCNC: 32.7 G/DL (ref 31.5–35.7)
MCV RBC AUTO: 96.8 FL (ref 79–97)
METHADONE UR QL SCN: NEGATIVE
MONOCYTES # BLD AUTO: 0.31 10*3/MM3 (ref 0.1–0.9)
MONOCYTES NFR BLD AUTO: 7 % (ref 5–12)
NEUTROPHILS NFR BLD AUTO: 2.12 10*3/MM3 (ref 1.7–7)
NEUTROPHILS NFR BLD AUTO: 48.1 % (ref 42.7–76)
NITRITE UR QL STRIP: NEGATIVE
NRBC BLD AUTO-RTO: 0 /100 WBC (ref 0–0.2)
OPIATES UR QL: POSITIVE
OXYCODONE UR QL SCN: POSITIVE
PCP UR QL SCN: NEGATIVE
PH UR STRIP.AUTO: 6.5 [PH] (ref 5–8)
PLATELET # BLD AUTO: 243 10*3/MM3 (ref 140–450)
PMV BLD AUTO: 10.4 FL (ref 6–12)
POTASSIUM SERPL-SCNC: 4.2 MMOL/L (ref 3.5–5.2)
PROT SERPL-MCNC: 7.3 G/DL (ref 6–8.5)
PROT UR QL STRIP: NEGATIVE
RBC # BLD AUTO: 4.39 10*6/MM3 (ref 3.77–5.28)
SODIUM SERPL-SCNC: 140 MMOL/L (ref 136–145)
SP GR UR STRIP: 1.01 (ref 1–1.03)
TRICYCLICS UR QL SCN: NEGATIVE
UROBILINOGEN UR QL STRIP: NORMAL
WBC NRBC COR # BLD AUTO: 4.41 10*3/MM3 (ref 3.4–10.8)

## 2024-06-05 PROCEDURE — 36415 COLL VENOUS BLD VENIPUNCTURE: CPT

## 2024-06-05 PROCEDURE — 25010000002 MIDAZOLAM PER 1 MG: Performed by: EMERGENCY MEDICINE

## 2024-06-05 PROCEDURE — 80307 DRUG TEST PRSMV CHEM ANLYZR: CPT | Performed by: EMERGENCY MEDICINE

## 2024-06-05 PROCEDURE — 99283 EMERGENCY DEPT VISIT LOW MDM: CPT

## 2024-06-05 PROCEDURE — 85025 COMPLETE CBC W/AUTO DIFF WBC: CPT | Performed by: EMERGENCY MEDICINE

## 2024-06-05 PROCEDURE — 81003 URINALYSIS AUTO W/O SCOPE: CPT | Performed by: EMERGENCY MEDICINE

## 2024-06-05 PROCEDURE — 80053 COMPREHEN METABOLIC PANEL: CPT | Performed by: EMERGENCY MEDICINE

## 2024-06-05 PROCEDURE — 82550 ASSAY OF CK (CPK): CPT | Performed by: EMERGENCY MEDICINE

## 2024-06-05 PROCEDURE — 81025 URINE PREGNANCY TEST: CPT | Performed by: EMERGENCY MEDICINE

## 2024-06-05 PROCEDURE — 96374 THER/PROPH/DIAG INJ IV PUSH: CPT

## 2024-06-05 RX ORDER — MIDAZOLAM HYDROCHLORIDE 1 MG/ML
1 INJECTION INTRAMUSCULAR; INTRAVENOUS ONCE
Status: COMPLETED | OUTPATIENT
Start: 2024-06-05 | End: 2024-06-05

## 2024-06-05 RX ORDER — OXYCODONE AND ACETAMINOPHEN 7.5; 325 MG/1; MG/1
1 TABLET ORAL ONCE
Status: COMPLETED | OUTPATIENT
Start: 2024-06-05 | End: 2024-06-05

## 2024-06-05 RX ORDER — OXYCODONE AND ACETAMINOPHEN 7.5; 325 MG/1; MG/1
1 TABLET ORAL EVERY 6 HOURS PRN
Qty: 12 TABLET | Refills: 0 | Status: SHIPPED | OUTPATIENT
Start: 2024-06-05

## 2024-06-05 RX ADMIN — OXYCODONE HYDROCHLORIDE AND ACETAMINOPHEN 1 TABLET: 7.5; 325 TABLET ORAL at 17:04

## 2024-06-05 RX ADMIN — MIDAZOLAM HYDROCHLORIDE 1 MG: 1 INJECTION, SOLUTION INTRAMUSCULAR; INTRAVENOUS at 18:48

## 2024-06-05 NOTE — ED PROVIDER NOTES
" EMERGENCY DEPARTMENT ENCOUNTER    Pt Name: Josephine Taylor  MRN: [id number]  Pt :   1990  Room Number:  16  Date of encounter:  2024  PCP: Faye Bee MD  ED Provider: Linwood Nair MD    Historian: patient and mother      HPI:  Chief Complaint: Severe back and neck pain        Context: Josephine Taylor is a 33 y.o. female who presents to the ED c/o severe neck and back pain starting yesterday morning.  The patient was trying to get out of bed and felt sharp shooting pains throughout her entire spine \"from my skull base to my hips\".  The patient reports suffering from \"severe scoliosis\".  She has tried Tylenol without significant relief.  The patient takes Klonopin for \"panic attacks\".  She states that she is unable to take NSAIDs secondary to a single ibuprofen causing an ulcer rapidly.  She reports that she is unable to take muscle relaxants because of an enzyme deficiency which causes slow breakdown of them and prolonged sedation.  Patient has taken Percocet status postlumpectomy in March.  The patient reports that she lives with \"chronic pain\".  She notes that this is significantly worse than baseline.    PAST MEDICAL HISTORY  Past Medical History:   Diagnosis Date    Anxiety     Arthritis     Back problem     Bronchitis     Chronic abdominal pain     Chronic nausea     Chronic pain disorder 2004    Colitis     CTS (carpal tunnel syndrome)     CYP2D6 poor metabolizer     Depression     Difficulty walking     Fibromyalgia, primary     Foot laceration 2023    No ligament/tendon damage    Fractures rib 2007    Genitofemoral neuropathy     left    Headache, tension-type 2019    Insomnia     Joint pain 2014    Kidney infection     Low back pain 2014    Memory loss     Migraines     Neck pain 2019    Panic disorder     Postconcussion syndrome     PTSD (post-traumatic stress disorder)     Scoliosis     Traumatic brain injury 2019    Vitamin D deficiency          PAST SURGICAL " HISTORY  Past Surgical History:   Procedure Laterality Date    BREAST BIOPSY Left 2009    bx and excisional    BREAST LUMPECTOMY       SECTION      ILIAC ARTERY STENT      OTHER SURGICAL HISTORY Left     renal vein embolization    TRIGGER POINT INJECTION      VASCULAR SURGERY  [id number]    Embolism         FAMILY HISTORY  Family History   Problem Relation Age of Onset    Anxiety disorder Mother     Hypertension Mother     Arthritis Mother     Mental illness Mother     Lupus Mother     Fibromyalgia Mother     Depression Father     COPD Father     Kidney disease Father     Heart disease Father     Heart attack Father     Mental illness Father     Heart failure Father     Alcohol abuse Father     Anxiety disorder Sister     Migraines Sister     Depression Sister     Depression Sister     Anxiety disorder Sister     ADD / ADHD Sister     Asthma Son     Melanoma Maternal Grandmother     Brain cancer Maternal Grandfather     Colon cancer Neg Hx     Colon polyps Neg Hx     Esophageal cancer Neg Hx     Ovarian cancer Neg Hx          SOCIAL HISTORY  Social History     Socioeconomic History    Marital status: Legally    Tobacco Use    Smoking status: Former     Current packs/day: 0.00     Average packs/day: 0.5 packs/day for 18.4 years (9.2 ttl pk-yrs)     Types: Cigarettes     Start date: 2005     Quit date: 2023     Years since quittin.9    Smokeless tobacco: Never   Vaping Use    Vaping status: Every Day    Substances: Nicotine, Flavoring   Substance and Sexual Activity    Alcohol use: Never    Drug use: Yes     Types: Marijuana     Comment: Pt states that she smokes about a few times a week.    Sexual activity: Yes     Partners: Male     Birth control/protection: None         ALLERGIES  Galcanezumab-gnlm, Aimovig [erenumab-aooe], Gabapentin, Sulfa antibiotics, Venlafaxine, and Ciprofloxacin        REVIEW OF SYSTEMS  Review of Systems       All systems reviewed and negative except for  those discussed in HPI.       PHYSICAL EXAM    I have reviewed the triage vital signs and nursing notes.    ED Triage Vitals [06/05/24 1615]   Temp Heart Rate Resp BP SpO2   98 °F (36.7 °C) 83 16 115/68 98 %      Temp src Heart Rate Source Patient Position BP Location FiO2 (%)   Oral Monitor Sitting Left arm --       Physical Exam  GENERAL:   Appears uncomfortable in a wheelchair accompanied by her mother  HENT: Nares patent.  No signs of craniofacial abnormalities  EYES: No scleral icterus.  CV: Regular rhythm, regular rate.  No murmurs gallops rubs.  2+ radial and dorsalis pedis pulses.  Cap refill is immediate in digits on bilateral lower extremities as well as upper extremities.  Clear to auscultation  RESPIRATORY: Normal effort.  No audible wheezes, rales or rhonchi.  ABDOMEN: Soft, nontender  MUSCULOSKELETAL: No deformities.  Diffuse tenderness to paraspinal musculature throughout the cervical thoracic and lumbar regions.  Spasm is noted of the musculature.  NEURO: Alert, moves all extremities, follows commands.  Fine touch and strength intact in upper and lower extremities with symmetry.  SKIN: Warm, dry, no rash visualized.      LAB RESULTS  Recent Results (from the past 24 hour(s))   Comprehensive Metabolic Panel    Collection Time: 06/05/24  4:43 PM    Specimen: Blood   Result Value Ref Range    Glucose 86 65 - 99 mg/dL    BUN 6 6 - 20 mg/dL    Creatinine 0.58 0.57 - 1.00 mg/dL    Sodium 140 136 - 145 mmol/L    Potassium 4.2 3.5 - 5.2 mmol/L    Chloride 105 98 - 107 mmol/L    CO2 25.0 22.0 - 29.0 mmol/L    Calcium 9.1 8.6 - 10.5 mg/dL    Total Protein 7.3 6.0 - 8.5 g/dL    Albumin 4.3 3.5 - 5.2 g/dL    ALT (SGPT) 23 1 - 33 U/L    AST (SGOT) 24 1 - 32 U/L    Alkaline Phosphatase 67 39 - 117 U/L    Total Bilirubin 0.4 0.0 - 1.2 mg/dL    Globulin 3.0 gm/dL    A/G Ratio 1.4 g/dL    BUN/Creatinine Ratio 10.3 7.0 - 25.0    Anion Gap 10.0 5.0 - 15.0 mmol/L    eGFR 122.7 >60.0 mL/min/1.73   CK    Collection Time:  06/05/24  4:43 PM    Specimen: Blood   Result Value Ref Range    Creatine Kinase 63 20 - 180 U/L   CBC Auto Differential    Collection Time: 06/05/24  4:43 PM    Specimen: Blood   Result Value Ref Range    WBC 4.41 3.40 - 10.80 10*3/mm3    RBC 4.39 3.77 - 5.28 10*6/mm3    Hemoglobin 13.9 12.0 - 15.9 g/dL    Hematocrit 42.5 34.0 - 46.6 %    MCV 96.8 79.0 - 97.0 fL    MCH 31.7 26.6 - 33.0 pg    MCHC 32.7 31.5 - 35.7 g/dL    RDW 11.7 (L) 12.3 - 15.4 %    RDW-SD 41.6 37.0 - 54.0 fl    MPV 10.4 6.0 - 12.0 fL    Platelets 243 140 - 450 10*3/mm3    Neutrophil % 48.1 42.7 - 76.0 %    Lymphocyte % 41.7 19.6 - 45.3 %    Monocyte % 7.0 5.0 - 12.0 %    Eosinophil % 2.3 0.3 - 6.2 %    Basophil % 0.7 0.0 - 1.5 %    Immature Grans % 0.2 0.0 - 0.5 %    Neutrophils, Absolute 2.12 1.70 - 7.00 10*3/mm3    Lymphocytes, Absolute 1.84 0.70 - 3.10 10*3/mm3    Monocytes, Absolute 0.31 0.10 - 0.90 10*3/mm3    Eosinophils, Absolute 0.10 0.00 - 0.40 10*3/mm3    Basophils, Absolute 0.03 0.00 - 0.20 10*3/mm3    Immature Grans, Absolute 0.01 0.00 - 0.05 10*3/mm3    nRBC 0.0 0.0 - 0.2 /100 WBC   Urinalysis With Microscopic If Indicated (No Culture) - Urine, Clean Catch    Collection Time: 06/05/24  6:44 PM    Specimen: Urine, Clean Catch   Result Value Ref Range    Color, UA Yellow Yellow, Straw    Appearance, UA Clear Clear    pH, UA 6.5 5.0 - 8.0    Specific Gravity, UA 1.010 1.001 - 1.030    Glucose, UA Negative Negative    Ketones, UA Negative Negative    Bilirubin, UA Negative Negative    Blood, UA Negative Negative    Protein, UA Negative Negative    Leuk Esterase, UA Negative Negative    Nitrite, UA Negative Negative    Urobilinogen, UA 0.2 E.U./dL 0.2 - 1.0 E.U./dL   Urine Drug Screen - Urine, Clean Catch    Collection Time: 06/05/24  6:44 PM    Specimen: Urine, Clean Catch   Result Value Ref Range    THC, Screen, Urine Positive (A) Negative    Phencyclidine (PCP), Urine Negative Negative    Cocaine Screen, Urine Negative Negative     Methamphetamine, Ur Negative Negative    Opiate Screen Positive (A) Negative    Amphetamine Screen, Urine Negative Negative    Benzodiazepine Screen, Urine Negative Negative    Tricyclic Antidepressants Screen Negative Negative    Methadone Screen, Urine Negative Negative    Barbiturates Screen, Urine Negative Negative    Oxycodone Screen, Urine Positive (A) Negative    Buprenorphine, Screen, Urine Negative Negative   Fentanyl, Urine - Urine, Clean Catch    Collection Time: 06/05/24  6:44 PM    Specimen: Urine, Clean Catch   Result Value Ref Range    Fentanyl, Urine Negative Negative   POC Urine Pregnancy    Collection Time: 06/05/24  7:12 PM    Specimen: Urine   Result Value Ref Range    HCG, Urine, QL Negative Negative    Lot Number [id number]     Internal Positive Control Positive Positive, Passed    Internal Negative Control Negative Negative, Passed    Expiration Date 01/28/2025        If labs were ordered, I independently reviewed the results and considered them in treating the patient.        RADIOLOGY  No Radiology Exams Resulted Within Past 24 Hours        PROCEDURES    Procedures    No orders to display       MEDICATIONS GIVEN IN ER    Medications   oxyCODONE-acetaminophen (PERCOCET) 7.5-325 MG per tablet 1 tablet (1 tablet Oral Given 6/5/24 1704)   midazolam (VERSED) injection 1 mg (1 mg Intravenous Given 6/5/24 1848)         MEDICAL DECISION MAKING, PROGRESS, and CONSULTS    All labs, if obtained, have been independently reviewed by me.  All radiology studies, if obtained, have been reviewed by me and the radiologist dictating the report.  All EKG's, if obtained, have been independently viewed and interpreted by me/my attending physician.      Discussion below represents my analysis of pertinent findings related to patient's condition, differential diagnosis, treatment plan and final disposition.                         Differential diagnosis:    Neck and back pain likely musculoskeletal in origin likely  the as a flareup from chronic pain/scoliotic changes which change her body mechanics.  Other more serious etiologies such as myositis, etc. considered as well.      Additional sources:    - Discussed/ obtained information from independent historians: Patient's mother is a retired nurse and a good historian.  She accompanies the patient and I speak with her.    - External (non-ED) record review: Reviewed prior records, outpatient, to include 3/1/2024 ultrasound placement of needle biopsy.    - Chronic or social conditions impacting care: Former smoker.  Denies alcohol use.    - Shared decision making: Patient/patient representative in complete agreement with current plans for evaluation and management.      Orders placed during this visit:  Orders Placed This Encounter   Procedures    Comprehensive Metabolic Panel    CK    CBC Auto Differential    Urinalysis With Microscopic If Indicated (No Culture) - Urine, Clean Catch    Urine Drug Screen - Urine, Clean Catch    Fentanyl, Urine - Urine, Clean Catch    POC Urine Pregnancy    CBC & Differential         Additional orders considered but not ordered:  Imaging.  I felt that benefits versus risk of manipulating the patient's spine further and causing increased discomfort resulted in avoidance of imaging.    ED Course:    Consultants:      ED Course as of 06/05/24 1950 Wed Jun 05, 2024 1947 After oral oxycodone and 1 mg of Versed the patient is feeling much improved.  I will have her follow-up with her pain management specialist as well as her PCP.  She and her mother appreciative of care and happy with the treatment here. [MS]   1949 The patient states that she has had no previous medication/drug addictions and tells me this in front of her mother who does not disagree.  I will write her for a small amount of oxycodone. [MS]      ED Course User Index  [MS] Linwood Nair MD              Shared Decision Making:  After my consideration of clinical presentation and  any laboratory/radiology studies obtained, I discussed the findings with the patient/patient representative who is in agreement with the treatment plan and the final disposition.   Risks and benefits of discharge and/or observation/admission were discussed.       AS OF 19:50 EDT VITALS:    BP - 97/51  HR - 73  TEMP - 98 °F (36.7 °C) (Oral)  O2 SATS - 97%                  DIAGNOSIS  Final diagnoses:   Acute bilateral back pain, unspecified back location         DISPOSITION  DISCHARGE    Patient discharged in stable condition.    Reviewed implications of results, diagnosis, meds, responsibility to follow up, warning signs and symptoms of possible worsening, potential complications and reasons to return to ER.    Patient/Family voiced understanding of above instructions.    Discussed plan for discharge, as there is no emergent indication for admission.  Pt/family is agreeable and understands need for follow up and possible repeat testing.  Pt/family is aware that discharge does not mean that nothing is wrong but that it indicates no emergency is currently present that requires admission and they must continue care with follow-up as given below or with a physician of their choice.     FOLLOW-UP  Faye Bee MD  [address] [facility] [address]  [facility] [id number] [phone]      NEXT AVAILABLE APPOINTMENT - RECHECK OF CONDITION    Guido Luna MD  [address] [facility] [address]  [place] [id number] [phone]      NEXT AVAILABLE APPOINTMENT - RECHECK OF CONDITION         Medication List        Changed      * oxyCODONE-acetaminophen 5-325 MG per tablet  Commonly known as: PERCOCET  Take 1 tablet by mouth Every 8 (Eight) Hours As Needed.  What changed: Another medication with the same name was added. Make sure you understand how and when to take each.     * oxyCODONE-acetaminophen 7.5-325 MG per tablet  Commonly known as: PERCOCET  Take 1 tablet by mouth Every 6 (Six) Hours As Needed for Severe  Pain.  What changed: You were already taking a medication with the same name, and this prescription was added. Make sure you understand how and when to take each.           * This list has 2 medication(s) that are the same as other medications prescribed for you. Read the directions carefully, and ask your doctor or other care provider to review them with you.                   Where to Get Your Medications        These medications were sent to [facility]/pharmacy #[id number] - [place], [place] - [address] [place] [address] AT [facility] - [phone]  - [phone] [address] [address]      Phone: [phone]   oxyCODONE-acetaminophen 7.5-325 MG per tablet             Please note that portions of this document were completed with voice recognition software.        Linwood Nair MD  06/06/24 0314

## 2024-06-10 ENCOUNTER — HOSPITAL ENCOUNTER (OUTPATIENT)
Dept: GENERAL RADIOLOGY | Facility: HOSPITAL | Age: 34
Discharge: HOME | End: 2024-06-10
Admitting: ANESTHESIOLOGY
Payer: MEDICAID

## 2024-06-10 ENCOUNTER — TRANSCRIBE ORDERS (OUTPATIENT)
Dept: GENERAL RADIOLOGY | Facility: HOSPITAL | Age: 34
End: 2024-06-10
Payer: MEDICAID

## 2024-06-10 DIAGNOSIS — M47.812 CERVICAL SPONDYLOSIS WITHOUT MYELOPATHY: Primary | ICD-10-CM

## 2024-06-10 PROCEDURE — 72040 X-RAY EXAM NECK SPINE 2-3 VW: CPT

## 2024-08-07 ENCOUNTER — APPOINTMENT (OUTPATIENT)
Dept: MRI IMAGING | Facility: HOSPITAL | Age: 34
End: 2024-08-07
Payer: MEDICAID

## 2024-08-07 ENCOUNTER — HOSPITAL ENCOUNTER (EMERGENCY)
Facility: HOSPITAL | Age: 34
Discharge: HOME | End: 2024-08-07
Attending: EMERGENCY MEDICINE
Payer: MEDICAID

## 2024-08-07 VITALS
HEART RATE: 82 BPM | SYSTOLIC BLOOD PRESSURE: 103 MMHG | BODY MASS INDEX: 18.33 KG/M2 | HEIGHT: 65 IN | RESPIRATION RATE: 18 BRPM | OXYGEN SATURATION: 98 % | DIASTOLIC BLOOD PRESSURE: 62 MMHG | TEMPERATURE: 98.9 F | WEIGHT: 110 LBS

## 2024-08-07 DIAGNOSIS — R20.0 NUMBNESS OF FACE: Primary | ICD-10-CM

## 2024-08-07 LAB
ALBUMIN SERPL-MCNC: 4.6 G/DL (ref 3.5–5.2)
ALBUMIN/GLOB SERPL: 1.6 G/DL
ALP SERPL-CCNC: 58 U/L (ref 39–117)
ALT SERPL W P-5'-P-CCNC: 18 U/L (ref 1–33)
ANION GAP SERPL CALCULATED.3IONS-SCNC: 5 MMOL/L (ref 5–15)
AST SERPL-CCNC: 25 U/L (ref 1–32)
B-HCG UR QL: NEGATIVE
BASOPHILS # BLD AUTO: 0.04 10*3/MM3 (ref 0–0.2)
BASOPHILS NFR BLD AUTO: 0.7 % (ref 0–1.5)
BILIRUB SERPL-MCNC: 0.4 MG/DL (ref 0–1.2)
BILIRUB UR QL STRIP: NEGATIVE
BUN SERPL-MCNC: 10 MG/DL (ref 6–20)
BUN/CREAT SERPL: 13 (ref 7–25)
CALCIUM SPEC-SCNC: 9.7 MG/DL (ref 8.6–10.5)
CHLORIDE SERPL-SCNC: 106 MMOL/L (ref 98–107)
CLARITY UR: CLEAR
CO2 SERPL-SCNC: 28 MMOL/L (ref 22–29)
COLOR UR: YELLOW
CREAT SERPL-MCNC: 0.77 MG/DL (ref 0.57–1)
DEPRECATED RDW RBC AUTO: 44 FL (ref 37–54)
EGFRCR SERPLBLD CKD-EPI 2021: 104.6 ML/MIN/1.73
EOSINOPHIL # BLD AUTO: 0.07 10*3/MM3 (ref 0–0.4)
EOSINOPHIL NFR BLD AUTO: 1.3 % (ref 0.3–6.2)
ERYTHROCYTE [DISTWIDTH] IN BLOOD BY AUTOMATED COUNT: 12.2 % (ref 12.3–15.4)
GLOBULIN UR ELPH-MCNC: 2.9 GM/DL
GLUCOSE SERPL-MCNC: 95 MG/DL (ref 65–99)
GLUCOSE UR STRIP-MCNC: NEGATIVE MG/DL
HCT VFR BLD AUTO: 41.2 % (ref 34–46.6)
HGB BLD-MCNC: 13.6 G/DL (ref 12–15.9)
HGB UR QL STRIP.AUTO: NEGATIVE
IMM GRANULOCYTES # BLD AUTO: 0.01 10*3/MM3 (ref 0–0.05)
IMM GRANULOCYTES NFR BLD AUTO: 0.2 % (ref 0–0.5)
KETONES UR QL STRIP: NEGATIVE
LEUKOCYTE ESTERASE UR QL STRIP.AUTO: NEGATIVE
LYMPHOCYTES # BLD AUTO: 1.54 10*3/MM3 (ref 0.7–3.1)
LYMPHOCYTES NFR BLD AUTO: 27.7 % (ref 19.6–45.3)
MAGNESIUM SERPL-MCNC: 2.2 MG/DL (ref 1.6–2.6)
MCH RBC QN AUTO: 32.5 PG (ref 26.6–33)
MCHC RBC AUTO-ENTMCNC: 33 G/DL (ref 31.5–35.7)
MCV RBC AUTO: 98.3 FL (ref 79–97)
MONOCYTES # BLD AUTO: 0.33 10*3/MM3 (ref 0.1–0.9)
MONOCYTES NFR BLD AUTO: 5.9 % (ref 5–12)
NEUTROPHILS NFR BLD AUTO: 3.56 10*3/MM3 (ref 1.7–7)
NEUTROPHILS NFR BLD AUTO: 64.2 % (ref 42.7–76)
NITRITE UR QL STRIP: NEGATIVE
NRBC BLD AUTO-RTO: 0 /100 WBC (ref 0–0.2)
PH UR STRIP.AUTO: 6 [PH] (ref 5–8)
PLATELET # BLD AUTO: 230 10*3/MM3 (ref 140–450)
PMV BLD AUTO: 10.8 FL (ref 6–12)
POTASSIUM SERPL-SCNC: 4.2 MMOL/L (ref 3.5–5.2)
PROT SERPL-MCNC: 7.5 G/DL (ref 6–8.5)
PROT UR QL STRIP: NEGATIVE
RBC # BLD AUTO: 4.19 10*6/MM3 (ref 3.77–5.28)
SODIUM SERPL-SCNC: 139 MMOL/L (ref 136–145)
SP GR UR STRIP: 1.01 (ref 1–1.03)
T4 FREE SERPL-MCNC: 1.67 NG/DL (ref 0.92–1.68)
TSH SERPL DL<=0.05 MIU/L-ACNC: 0.66 UIU/ML (ref 0.27–4.2)
UROBILINOGEN UR QL STRIP: NORMAL
WBC NRBC COR # BLD AUTO: 5.55 10*3/MM3 (ref 3.4–10.8)

## 2024-08-07 PROCEDURE — 84439 ASSAY OF FREE THYROXINE: CPT | Performed by: EMERGENCY MEDICINE

## 2024-08-07 PROCEDURE — 80050 GENERAL HEALTH PANEL: CPT | Performed by: EMERGENCY MEDICINE

## 2024-08-07 PROCEDURE — 36415 COLL VENOUS BLD VENIPUNCTURE: CPT

## 2024-08-07 PROCEDURE — 81003 URINALYSIS AUTO W/O SCOPE: CPT | Performed by: EMERGENCY MEDICINE

## 2024-08-07 PROCEDURE — 81025 URINE PREGNANCY TEST: CPT | Performed by: EMERGENCY MEDICINE

## 2024-08-07 PROCEDURE — 99284 EMERGENCY DEPT VISIT MOD MDM: CPT

## 2024-08-07 PROCEDURE — 83735 ASSAY OF MAGNESIUM: CPT | Performed by: EMERGENCY MEDICINE

## 2024-08-07 PROCEDURE — 70551 MRI BRAIN STEM W/O DYE: CPT

## 2024-08-07 NOTE — ED PROVIDER NOTES
Subjective   History of Present Illness  33-year-old female presents for evaluation of facial paresthesias.  She tells me that just prior to coming to the emergency department today, she was outside in the heat and began experiencing a sensation of numbness and tingling to her face.  Her symptoms lasted for a few minutes before resolving spontaneously.  No accompanying weakness.  No headache.  No changes to her speech.  She did not bite her tongue.  No vision changes.  Of note, the patient tells me that she has had similar episodes intermittently over the past several months that have affected all 4 of her extremities.  Given her symptoms, she was concerned about a potential neurological issue and came here to the ED to be evaluated.  Currently, she feels well and is asymptomatic.  She notes that her symptoms have resolved.      Review of Systems   Neurological:  Positive for numbness.   All other systems reviewed and are negative.      Past Medical History:   Diagnosis Date    Anxiety     Arthritis     Back problem     Bronchitis     Chronic abdominal pain     Chronic nausea     Chronic pain disorder 2004    Colitis     CTS (carpal tunnel syndrome)     CYP2D6 poor metabolizer     Depression     Difficulty walking     Fibromyalgia, primary     Foot laceration 07/12/2023    No ligament/tendon damage    Fractures rib 2007    Genitofemoral neuropathy     left    Headache, tension-type 2019    Insomnia     Joint pain 2014    Kidney infection     Low back pain 2014    Memory loss     Migraines     Neck pain 2019    Panic disorder     Postconcussion syndrome     PTSD (post-traumatic stress disorder)     Scoliosis     Traumatic brain injury 2019    Vitamin D deficiency        Allergies   Allergen Reactions    Galcanezumab-Gnlm Shortness Of Breath    Aimovig [Erenumab-Aooe] Other (See Comments)     Told to avoid due to hives, SOA with Emgality    Gabapentin Dizziness    Sulfa Antibiotics Hives    Venlafaxine Other (See  Comments)     Worsened headaches and severe mood changes    Ciprofloxacin Unknown - High Severity       Past Surgical History:   Procedure Laterality Date    BREAST BIOPSY Left 2009    bx and excisional    BREAST LUMPECTOMY       SECTION      ILIAC ARTERY STENT      OTHER SURGICAL HISTORY Left     renal vein embolization    TRIGGER POINT INJECTION      VASCULAR SURGERY  [id number]    Embolism       Family History   Problem Relation Age of Onset    Anxiety disorder Mother     Hypertension Mother     Arthritis Mother     Mental illness Mother     Lupus Mother     Fibromyalgia Mother     Depression Father     COPD Father     Kidney disease Father     Heart disease Father     Heart attack Father     Mental illness Father     Heart failure Father     Alcohol abuse Father     Anxiety disorder Sister     Migraines Sister     Depression Sister     Depression Sister     Anxiety disorder Sister     ADD / ADHD Sister     Asthma Son     Melanoma Maternal Grandmother     Brain cancer Maternal Grandfather     Colon cancer Neg Hx     Colon polyps Neg Hx     Esophageal cancer Neg Hx     Ovarian cancer Neg Hx        Social History     Socioeconomic History    Marital status: Legally    Tobacco Use    Smoking status: Former     Current packs/day: 0.00     Average packs/day: 0.5 packs/day for 18.4 years (9.2 ttl pk-yrs)     Types: Cigarettes     Start date: 2005     Quit date: 2023     Years since quittin.1    Smokeless tobacco: Never   Vaping Use    Vaping status: Every Day    Substances: Nicotine, Flavoring   Substance and Sexual Activity    Alcohol use: Never    Drug use: Yes     Types: Marijuana     Comment: Pt states that she smokes about a few times a week.    Sexual activity: Yes     Partners: Male     Birth control/protection: None           Objective   Physical Exam  Vitals and nursing note reviewed.   Constitutional:       General: She is not in acute distress.     Appearance: Normal  appearance. She is well-developed. She is not diaphoretic.      Comments: Nontoxic-appearing female   HENT:      Head: Normocephalic and atraumatic.   Eyes:      Pupils: Pupils are equal, round, and reactive to light.   Neck:      Vascular: No carotid bruit.      Comments: No meningeal signs or nuchal rigidity  Cardiovascular:      Rate and Rhythm: Normal rate and regular rhythm.      Heart sounds: Normal heart sounds. No murmur heard.     No friction rub. No gallop.   Pulmonary:      Effort: Pulmonary effort is normal. No respiratory distress.      Breath sounds: Normal breath sounds. No wheezing or rales.   Abdominal:      General: Bowel sounds are normal. There is no distension.      Palpations: Abdomen is soft. There is no mass.      Tenderness: There is no abdominal tenderness. There is no guarding or rebound.   Musculoskeletal:         General: Normal range of motion.      Cervical back: Neck supple.   Skin:     General: Skin is warm and dry.      Findings: No erythema or rash.   Neurological:      Mental Status: She is alert and oriented to person, place, and time.      Comments: Awake, alert, and oriented x3, clear and fluent speech, no ataxia or dysmetria, normal gait, neurovascularly intact distally in all fours with bounding distal pulses and normal sensation, 5 out of 5 strength in all fours, no cranial nerve deficits noted with cranial nerves II through XII grossly intact   Psychiatric:         Mood and Affect: Mood normal.         Thought Content: Thought content normal.         Judgment: Judgment normal.         Procedures           ED Course  ED Course as of 08/07/24 1815   Wed Aug 07, 2024   1610 33-year-old female presents for evaluation of facial paresthesias.  She tells me that just prior to coming to the emergency department today, she was outside in the heat and began experiencing a sensation of numbness and tingling to her face.  Her symptoms lasted for minutes before resolving spontaneously.   No accompanying weakness.  She is unsure as to what may have triggered her symptoms send tells me that she has had similar episodes over the past several months affecting all 4 of her extremities.  Given her ongoing symptoms, she came here to the ED to be evaluated today.  On arrival, the patient is well-appearing.  Benign exam.  She is currently asymptomatic.  No focal neurological deficits noted at this time.  She did not bite her tongue.  Broad differential diagnosis.  We will obtain labs and imaging, and we will reassess following initial interventions. [DD]   1726 I personally and independently reviewed the patient's MRI images and findings, and I am in agreement with the radiologist regarding MRI interpretation--particularly there is no emergent central process noted. [DD]   1726 Upon reevaluation, the patient remains asymptomatic.  I feel that she can safely be managed on an outpatient basis at this point.  I have referred her to Dr. Perez of neurology.  She will follow-up within the next week.  Agreeable with plan and given appropriate strict return precautions. [DD]      ED Course User Index  [DD] Amarjit Valdez MD                                   Recent Results (from the past 24 hour(s))   Urinalysis With Microscopic If Indicated (No Culture) - Urine, Clean Catch    Collection Time: 08/07/24  3:06 PM    Specimen: Urine, Clean Catch   Result Value Ref Range    Color, UA Yellow Yellow, Straw    Appearance, UA Clear Clear    pH, UA 6.0 5.0 - 8.0    Specific Gravity, UA 1.007 1.001 - 1.030    Glucose, UA Negative Negative    Ketones, UA Negative Negative    Bilirubin, UA Negative Negative    Blood, UA Negative Negative    Protein, UA Negative Negative    Leuk Esterase, UA Negative Negative    Nitrite, UA Negative Negative    Urobilinogen, UA 0.2 E.U./dL 0.2 - 1.0 E.U./dL   Pregnancy, Urine - Urine, Clean Catch    Collection Time: 08/07/24  3:06 PM    Specimen: Urine, Clean Catch   Result Value Ref Range     HCG, Urine QL Negative Negative   CBC Auto Differential    Collection Time: 08/07/24  3:18 PM    Specimen: Blood   Result Value Ref Range    WBC 5.55 3.40 - 10.80 10*3/mm3    RBC 4.19 3.77 - 5.28 10*6/mm3    Hemoglobin 13.6 12.0 - 15.9 g/dL    Hematocrit 41.2 34.0 - 46.6 %    MCV 98.3 (H) 79.0 - 97.0 fL    MCH 32.5 26.6 - 33.0 pg    MCHC 33.0 31.5 - 35.7 g/dL    RDW 12.2 (L) 12.3 - 15.4 %    RDW-SD 44.0 37.0 - 54.0 fl    MPV 10.8 6.0 - 12.0 fL    Platelets 230 140 - 450 10*3/mm3    Neutrophil % 64.2 42.7 - 76.0 %    Lymphocyte % 27.7 19.6 - 45.3 %    Monocyte % 5.9 5.0 - 12.0 %    Eosinophil % 1.3 0.3 - 6.2 %    Basophil % 0.7 0.0 - 1.5 %    Immature Grans % 0.2 0.0 - 0.5 %    Neutrophils, Absolute 3.56 1.70 - 7.00 10*3/mm3    Lymphocytes, Absolute 1.54 0.70 - 3.10 10*3/mm3    Monocytes, Absolute 0.33 0.10 - 0.90 10*3/mm3    Eosinophils, Absolute 0.07 0.00 - 0.40 10*3/mm3    Basophils, Absolute 0.04 0.00 - 0.20 10*3/mm3    Immature Grans, Absolute 0.01 0.00 - 0.05 10*3/mm3    nRBC 0.0 0.0 - 0.2 /100 WBC   Comprehensive Metabolic Panel    Collection Time: 08/07/24  3:18 PM    Specimen: Blood   Result Value Ref Range    Glucose 95 65 - 99 mg/dL    BUN 10 6 - 20 mg/dL    Creatinine 0.77 0.57 - 1.00 mg/dL    Sodium 139 136 - 145 mmol/L    Potassium 4.2 3.5 - 5.2 mmol/L    Chloride 106 98 - 107 mmol/L    CO2 28.0 22.0 - 29.0 mmol/L    Calcium 9.7 8.6 - 10.5 mg/dL    Total Protein 7.5 6.0 - 8.5 g/dL    Albumin 4.6 3.5 - 5.2 g/dL    ALT (SGPT) 18 1 - 33 U/L    AST (SGOT) 25 1 - 32 U/L    Alkaline Phosphatase 58 39 - 117 U/L    Total Bilirubin 0.4 0.0 - 1.2 mg/dL    Globulin 2.9 gm/dL    A/G Ratio 1.6 g/dL    BUN/Creatinine Ratio 13.0 7.0 - 25.0    Anion Gap 5.0 5.0 - 15.0 mmol/L    eGFR 104.6 >60.0 mL/min/1.73   TSH    Collection Time: 08/07/24  3:18 PM    Specimen: Blood   Result Value Ref Range    TSH 0.656 0.270 - 4.200 uIU/mL   T4, Free    Collection Time: 08/07/24  3:18 PM    Specimen: Blood   Result Value Ref  "Range    Free T4 1.67 0.92 - 1.68 ng/dL   Magnesium    Collection Time: 08/07/24  3:18 PM    Specimen: Blood   Result Value Ref Range    Magnesium 2.2 1.6 - 2.6 mg/dL     Note: In addition to lab results from this visit, the labs listed above may include labs taken at another facility or during a different encounter within the last 24 hours. Please correlate lab times with ED admission and discharge times for further clarification of the services performed during this visit.    MRI Brain Without Contrast   Final Result   Impression:   Examination appears within normal limits.            Electronically Signed: Amarjit Vickers MD     8/7/2024 5:21 PM EDT     Workstation ID: [id number]        Vitals:    08/07/24 1456 08/07/24 1745   BP: 103/62    BP Location: Right arm    Patient Position: Sitting    Pulse: 81 82   Resp: 14 18   Temp: 98.9 °F (37.2 °C)    TempSrc: Oral    SpO2: 98% 98%   Weight: 49.9 kg (110 lb)    Height: 165.1 cm (65\")      Medications - No data to display  ECG/EMG Results (last 24 hours)       ** No results found for the last 24 hours. **          No orders to display                 Medical Decision Making  Problems Addressed:  Numbness of face: complicated acute illness or injury    Amount and/or Complexity of Data Reviewed  Radiology: ordered.        Final diagnoses:   Numbness of face       ED Disposition  ED Disposition       ED Disposition   Discharge    Condition   Stable    Comment   --               Ange Perez MD  [address] [address] [phone]    In 1 week           Medication List      No changes were made to your prescriptions during this visit.            Amarjit Valdez MD  08/07/24 1815    "

## 2024-08-12 ENCOUNTER — PATIENT OUTREACH (OUTPATIENT)
Dept: CASE MANAGEMENT | Facility: OTHER | Age: 34
End: 2024-08-12
Payer: MEDICAID

## 2024-08-12 ENCOUNTER — PATIENT MESSAGE (OUTPATIENT)
Age: 34
End: 2024-08-12
Payer: MEDICAID
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
## 2024-08-27 ENCOUNTER — OFFICE VISIT (OUTPATIENT)
Age: 34
End: 2024-08-27
Payer: MEDICAID

## 2024-08-27 VITALS
BODY MASS INDEX: 18.33 KG/M2 | DIASTOLIC BLOOD PRESSURE: 62 MMHG | HEIGHT: 65 IN | HEART RATE: 82 BPM | SYSTOLIC BLOOD PRESSURE: 98 MMHG | WEIGHT: 110 LBS | OXYGEN SATURATION: 96 %

## 2024-08-27 DIAGNOSIS — R20.2 PARESTHESIA: Primary | ICD-10-CM

## 2024-08-27 DIAGNOSIS — G89.29 CHRONIC NECK PAIN: ICD-10-CM

## 2024-08-27 DIAGNOSIS — M54.2 CHRONIC NECK PAIN: ICD-10-CM

## 2024-08-27 LAB
NCCN CRITERIA FLAG: NORMAL
TYRER CUZICK SCORE: 9.7

## 2024-08-27 PROCEDURE — 1125F AMNT PAIN NOTED PAIN PRSNT: CPT | Performed by: FAMILY MEDICINE

## 2024-08-27 PROCEDURE — 1160F RVW MEDS BY RX/DR IN RCRD: CPT | Performed by: FAMILY MEDICINE

## 2024-08-27 PROCEDURE — 1159F MED LIST DOCD IN RCRD: CPT | Performed by: FAMILY MEDICINE

## 2024-08-27 PROCEDURE — 99214 OFFICE O/P EST MOD 30 MIN: CPT | Performed by: FAMILY MEDICINE
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
## 2024-09-03 ENCOUNTER — PATIENT OUTREACH (OUTPATIENT)
Dept: CASE MANAGEMENT | Facility: OTHER | Age: 34
End: 2024-09-03
Payer: MEDICAID

## 2024-09-03 NOTE — OUTREACH NOTE
AMBULATORY CASE MANAGEMENT NOTE    Names and Relationships of Patient/Support Persons: Contact: Josephine Taylor; Relationship: Self -     Patient Outreach    Patient reports seeing PCP as scheduled and was given the order for an MRI of her cervical spine; has also been referred to neurology. Pt states she has since been advised that Dr. Perez had declined the referral as she'd been a patient of Dr. France previously at that practice. Pt would like to schedule with the [facility] location if possible. ACM will reach out to that location and inquire about scheduling.    Care Coordination    RN-ACM spoke with Janet with [facility], ACM discussed patient's referral to [facility] and asked if patient could be scheduled with their provider, Dr. Stone. Janet confirmed seeing referral in epic and states they will reach out to the patient for scheduling.     Interim outreach scheduled with patient for follow-up.    Education Documentation  Procedure Review, taught by Leena Villafuerte, RN at 9/3/2024  1:12 PM.  Learner: Patient  Readiness: Acceptance  Method: Explanation  Response: Verbalizes Understanding    Unresolved/Worsening Symptoms, taught by Leena Villafuerte, RN at 9/3/2024  1:12 PM.  Learner: Patient  Readiness: Acceptance  Method: Explanation  Response: Verbalizes Understanding    Unresolved/Worsening Symptoms, taught by Leena Villafuerte, RN at 9/3/2024  1:12 PM.  Learner: Patient  Readiness: Acceptance  Method: Explanation  Response: Verbalizes Understanding    Signs/Symptoms, taught by Leena Villafuerte, RN at 9/3/2024  1:12 PM.  Learner: Patient  Readiness: Acceptance  Method: Explanation  Response: Verbalizes Understanding          Leena GARZA  Ambulatory Case Management    9/3/2024, 13:13 EDT

## 2024-09-11 ENCOUNTER — HOSPITAL ENCOUNTER (OUTPATIENT)
Dept: MAMMOGRAPHY | Facility: HOSPITAL | Age: 34
Discharge: HOME | End: 2024-09-11
Admitting: SURGERY
Payer: MEDICAID

## 2024-09-11 DIAGNOSIS — R92.8 ABNORMAL MAMMOGRAM: ICD-10-CM

## 2024-09-11 PROCEDURE — 77065 DX MAMMO INCL CAD UNI: CPT

## 2024-09-11 PROCEDURE — G0279 TOMOSYNTHESIS, MAMMO: HCPCS

## 2024-09-17 ENCOUNTER — HOSPITAL ENCOUNTER (OUTPATIENT)
Dept: MRI IMAGING | Facility: HOSPITAL | Age: 34
Discharge: HOME | End: 2024-09-17
Admitting: FAMILY MEDICINE
Payer: MEDICAID

## 2024-09-17 DIAGNOSIS — M54.2 CHRONIC NECK PAIN: ICD-10-CM

## 2024-09-17 DIAGNOSIS — R20.2 PARESTHESIA: ICD-10-CM

## 2024-09-17 DIAGNOSIS — G89.29 CHRONIC NECK PAIN: ICD-10-CM

## 2024-09-17 PROCEDURE — 72141 MRI NECK SPINE W/O DYE: CPT

## 2024-09-18 ENCOUNTER — PATIENT MESSAGE (OUTPATIENT)
Age: 34
End: 2024-09-18
Payer: MEDICAID

## 2024-11-18 ENCOUNTER — LAB (OUTPATIENT)
Age: 34
End: 2024-11-18
Payer: MEDICAID

## 2024-11-18 ENCOUNTER — OFFICE VISIT (OUTPATIENT)
Age: 34
End: 2024-11-18
Payer: MEDICAID

## 2024-11-18 ENCOUNTER — PATIENT OUTREACH (OUTPATIENT)
Dept: CASE MANAGEMENT | Facility: OTHER | Age: 34
End: 2024-11-18
Payer: MEDICAID

## 2024-11-18 VITALS
WEIGHT: 116.3 LBS | DIASTOLIC BLOOD PRESSURE: 62 MMHG | SYSTOLIC BLOOD PRESSURE: 102 MMHG | BODY MASS INDEX: 19.38 KG/M2 | HEIGHT: 65 IN

## 2024-11-18 DIAGNOSIS — Z11.3 ROUTINE SCREENING FOR STI (SEXUALLY TRANSMITTED INFECTION): ICD-10-CM

## 2024-11-18 DIAGNOSIS — R21 MALAR RASH: ICD-10-CM

## 2024-11-18 DIAGNOSIS — Z84.0 FH: LUPUS ERYTHEMATOSUS: ICD-10-CM

## 2024-11-18 DIAGNOSIS — M25.50 ARTHRALGIA, UNSPECIFIED JOINT: ICD-10-CM

## 2024-11-18 DIAGNOSIS — N91.2 AMENORRHEA: ICD-10-CM

## 2024-11-18 DIAGNOSIS — N89.8 VAGINAL DISCHARGE: Primary | ICD-10-CM

## 2024-11-18 PROCEDURE — 86592 SYPHILIS TEST NON-TREP QUAL: CPT | Performed by: FAMILY MEDICINE

## 2024-11-18 PROCEDURE — 84550 ASSAY OF BLOOD/URIC ACID: CPT | Performed by: FAMILY MEDICINE

## 2024-11-18 PROCEDURE — G0432 EIA HIV-1/HIV-2 SCREEN: HCPCS | Performed by: FAMILY MEDICINE

## 2024-11-18 PROCEDURE — 99214 OFFICE O/P EST MOD 30 MIN: CPT | Performed by: FAMILY MEDICINE

## 2024-11-18 PROCEDURE — 1160F RVW MEDS BY RX/DR IN RCRD: CPT | Performed by: FAMILY MEDICINE

## 2024-11-18 PROCEDURE — 1159F MED LIST DOCD IN RCRD: CPT | Performed by: FAMILY MEDICINE

## 2024-11-18 PROCEDURE — 36415 COLL VENOUS BLD VENIPUNCTURE: CPT | Performed by: FAMILY MEDICINE

## 2024-11-18 PROCEDURE — 85652 RBC SED RATE AUTOMATED: CPT | Performed by: FAMILY MEDICINE

## 2024-11-18 PROCEDURE — 1125F AMNT PAIN NOTED PAIN PRSNT: CPT | Performed by: FAMILY MEDICINE

## 2024-11-18 PROCEDURE — 86140 C-REACTIVE PROTEIN: CPT | Performed by: FAMILY MEDICINE

## 2024-11-18 PROCEDURE — 86803 HEPATITIS C AB TEST: CPT | Performed by: FAMILY MEDICINE

## 2024-11-18 PROCEDURE — 86431 RHEUMATOID FACTOR QUANT: CPT | Performed by: FAMILY MEDICINE

## 2024-11-18 PROCEDURE — 87340 HEPATITIS B SURFACE AG IA: CPT | Performed by: FAMILY MEDICINE

## 2024-11-18 RX ORDER — CLONAZEPAM 1 MG/1
1 TABLET ORAL 3 TIMES DAILY PRN
COMMUNITY

## 2024-11-18 NOTE — PROGRESS NOTES
Chief Complaint  Vaginal Discharge (Possible STD exposure), Vaginal Itching, and Menstrual Problem    Subjective        Josephine Taylor presents to [facility]  History of Present Illness    History of Present Illness  The patient is a 33-year-old female presenting for vaginal discharge, vaginal itching, and menstrual problems.    She began experiencing vaginal discharge approximately 4 to 5 days ago. Initially, the discharge was clear and white, but it has since changed to a yellow-green color. She also reports itching that started last night. She had unprotected sexual intercourse with one partner on 10/04/2024. She experienced several episodes of bleeding in 10/2024, including a normal 4 to 5-day period at the end of the month, followed by a 7-day period 10 days later. Since then, she has not had a period for 48 days. A home pregnancy test was negative. The irregular bleeding began before the onset of the vaginal discharge. She is not experiencing any pelvic pain. She had a fever of 103 degrees and chills before the onset of the discharge. She sought treatment at an urgent care clinic where she was prescribed prednisone, which significantly improved her condition within a day. She completed the entire course of prednisone.    She recently saw her pain doctor for neck issues and has a neurology appointment scheduled for Monday. Her pain doctor noticed a malar rash on her face and expressed concern about a potential autoimmune disease. He requested that she undergo a workup for autoimmune disease. She has been receiving injections for back pain for the past 7 to 8 years, but they are no longer effective. Her pain doctor suspects that an autoimmune disease may be attacking her muscles and joints, causing significant pain. He also noted that the lack of response to steroid injections could be indicative of an autoimmune disease. Her primary care doctor, Dr. Luna, suggested that her  "symptoms might be due to intracranial vascular issues.    FAMILY HISTORY  Her mother has lupus.    Objective   Vital Signs:  /62 (BP Location: Right arm, Patient Position: Sitting, Cuff Size: Adult)   Ht 165.1 cm (65\")   Wt 52.8 kg (116 lb 4.8 oz)   BMI 19.35 kg/m²   Estimated body mass index is 19.35 kg/m² as calculated from the following:    Height as of this encounter: 165.1 cm (65\").    Weight as of this encounter: 52.8 kg (116 lb 4.8 oz).    BMI is within normal parameters. No other follow-up for BMI required.      The following portions of the patient's history were reviewed and updated as appropriate: allergies, current medications, past family history, past medical history, past social history, past surgical history, and problem list.       Physical Exam  Vitals reviewed. Exam conducted with a chaperone present.   Constitutional:       General: She is not in acute distress.     Appearance: She is not ill-appearing, toxic-appearing or diaphoretic.   Cardiovascular:      Rate and Rhythm: Normal rate and regular rhythm.   Pulmonary:      Effort: Pulmonary effort is normal.      Breath sounds: Normal breath sounds.   Genitourinary:     General: Normal vulva.      Pubic Area: No rash.       Labia:         Right: No rash or lesion.         Left: No rash or lesion.       Vagina: Vaginal discharge (creamy) present. No erythema, tenderness, bleeding or lesions.      Cervix: Discharge (creamy) present. No cervical motion tenderness, friability, erythema or cervical bleeding.      Uterus: Normal. Not enlarged and not tender.       Comments: No vaginal odor  Neurological:      Mental Status: She is alert.        Result Review :                Assessment and Plan   Diagnoses and all orders for this visit:    1. Vaginal discharge (Primary)  -     NuSwab VG+ - Swab, Vagina; Future  -     NuSwab VG+ - Swab, Vagina    2. Routine screening for STI (sexually transmitted infection)  -     NuSwab VG+ - Swab, Vagina; " Future  -     HIV-1 / O / 2 Ag / Antibody; Future  -     RPR Qualitative with Reflex to Quant; Future  -     Hepatitis B Surface Antigen; Future  -     Hepatitis C Antibody; Future  -     NuSwab VG+ - Swab, Vagina    3. Malar rash  -     Sedimentation Rate; Future  -     SAMY; Future  -     Rheumatoid Factor; Future  -     Uric Acid; Future  -     Anti-DNA Antibody, Double-stranded; Future  -     C-reactive Protein; Future  -     Anti-scleroderma Antibody; Future  -     ANCA Panel; Future    4. Arthralgia, unspecified joint  -     Sedimentation Rate; Future  -     SAMY; Future  -     Rheumatoid Factor; Future  -     Uric Acid; Future  -     Anti-DNA Antibody, Double-stranded; Future  -     C-reactive Protein; Future  -     Anti-scleroderma Antibody; Future  -     ANCA Panel; Future    5. FH: lupus erythematosus  -     Sedimentation Rate; Future  -     SAMY; Future  -     Rheumatoid Factor; Future  -     Uric Acid; Future  -     Anti-DNA Antibody, Double-stranded; Future  -     C-reactive Protein; Future  -     Anti-scleroderma Antibody; Future  -     ANCA Panel; Future    6. Amenorrhea  -     HCG, B-subunit, Quantitative; Future             Assessment & Plan  1. Vaginal discharge.  She reports yellow-green vaginal discharge that started about four to five days ago. A pelvic exam  conducted today does not show signs of pelvic inflammatory disease.  Vaginal swab collected and based upon results we will decide if antibiotic treatment is indicated.  A series of blood tests will be ordered to screen for sexually transmitted infections, including HIV, syphilis, and hepatitis.    3. Irregular menstrual bleeding.  She reports having multiple episodes of bleeding in October and has not had a period for 48 days since then. A pregnancy test at home was negative. Further evaluation with hCG at the lab.    4.  Arthralgia  Given her symptoms, family history of lupus, and the presence of a malar rash, an autoimmune disease is a  potential cause. A comprehensive autoimmune lab work will be ordered to further investigate this possibility.        Follow Up   Return if symptoms worsen or fail to improve.  Patient was given instructions and counseling regarding her condition or for health maintenance advice. Please see specific information pulled into the AVS if appropriate.         Patient or patient representative verbalized consent for the use of Ambient Listening during the visit with  Faye Bee MD for chart documentation. 11/18/2024  15:54 EST    Electronically signed by Faye Bee MD, 11/18/24, 3:54 PM EST.

## 2024-11-18 NOTE — OUTREACH NOTE
AMBULATORY CASE MANAGEMENT NOTE    Names and Relationships of Patient/Support Persons: Contact: Josephine Taylor; Relationship: Self -     Patient Outreach    Scheduled outreach with patient who reports her pain clinic provider feels pt's pain is unrelated to her neck and is concerned about intracranial vascular issues. Pt sees vascular surgeon/specialist tomorrow to address these concerns. Pt is also to see PCP today and will ask about autoimmune testing which was also encouraged by pain clinic provider. Education provided, understanding voiced. Interim outreach scheduled to follow-up after upcoming appointments.     Education Documentation  Provider Follow-Up, taught by Leena Villafuerte RN at 11/18/2024 12:29 PM.  Learner: Patient  Readiness: Acceptance  Method: Explanation  Response: Verbalizes Understanding    Procedure Site Care, taught by Leena Villafuerte RN at 11/18/2024 12:29 PM.  Learner: Patient  Readiness: Acceptance  Method: Explanation  Response: Verbalizes Understanding    Fluid Intake, taught by Leena Villafuerte RN at 11/18/2024 12:29 PM.  Learner: Patient  Readiness: Acceptance  Method: Explanation  Response: Verbalizes Understanding    Activity, taught by Leena Villafuerte RN at 11/18/2024 12:29 PM.  Learner: Patient  Readiness: Acceptance  Method: Explanation  Response: Verbalizes Understanding    Indications, taught by Leena Villafuerte RN at 11/18/2024 12:29 PM.  Learner: Patient  Readiness: Acceptance  Method: Explanation  Response: Verbalizes Understanding    Procedure Review, taught by Leena Villafuerte RN at 11/18/2024 12:29 PM.  Learner: Patient  Readiness: Acceptance  Method: Explanation  Response: Verbalizes Understanding    Risk Factors, taught by Leena Villafuerte RN at 11/18/2024 12:29 PM.  Learner: Patient  Readiness: Acceptance  Method: Explanation  Response: Verbalizes Understanding    Signs/Symptoms, taught by Leena Villafuerte RN at 11/18/2024 12:29 PM.  Learner: Patient  Readiness:  Acceptance  Method: Explanation  Response: Verbalizes Understanding    Provider Follow-Up, taught by Leena Villafuerte, RN at 11/18/2024 12:29 PM.  Learner: Patient  Readiness: Acceptance  Method: Explanation  Response: Verbalizes Understanding    preventive care, taught by Leena Villafuerte, RN at 11/18/2024 12:29 PM.  Learner: Patient  Readiness: Acceptance  Method: Explanation  Response: Verbalizes Understanding          Leena GARZA  Ambulatory Case Management    11/18/2024, 12:29 EST

## 2024-11-19 LAB
CHROMATIN AB SERPL-ACNC: 10.3 IU/ML (ref 0–14)
CRP SERPL-MCNC: <0.3 MG/DL (ref 0–0.5)
ERYTHROCYTE [SEDIMENTATION RATE] IN BLOOD: 21 MM/HR (ref 0–20)
HBV SURFACE AG SERPL QL IA: NORMAL
HCV AB SER QL: NORMAL
HIV 1+2 AB+HIV1 P24 AG SERPL QL IA: NORMAL
RPR SER QL: NORMAL
URATE SERPL-MCNC: 3.5 MG/DL (ref 2.4–5.7)

## 2024-11-20 LAB
A VAGINAE DNA VAG QL NAA+PROBE: ABNORMAL SCORE
ANA SER QL: NEGATIVE
BVAB2 DNA VAG QL NAA+PROBE: ABNORMAL SCORE
C ALBICANS DNA VAG QL NAA+PROBE: NEGATIVE
C GLABRATA DNA VAG QL NAA+PROBE: NEGATIVE
C TRACH DNA SPEC QL NAA+PROBE: NEGATIVE
DSDNA AB SER-ACNC: 1 IU/ML (ref 0–9)
ENA SCL70 AB SER-ACNC: <0.2 AI (ref 0–0.9)
HCG INTACT+B SERPL-ACNC: <1 MIU/ML
MEGA1 DNA VAG QL NAA+PROBE: ABNORMAL SCORE
N GONORRHOEA DNA VAG QL NAA+PROBE: NEGATIVE
T VAGINALIS DNA VAG QL NAA+PROBE: POSITIVE

## 2024-11-21 LAB
C-ANCA TITR SER IF: NORMAL TITER
MYELOPEROXIDASE AB SER IA-ACNC: <0.2 UNITS (ref 0–0.9)
P-ANCA ATYPICAL TITR SER IF: NORMAL TITER
P-ANCA TITR SER IF: NORMAL TITER
PROTEINASE3 AB SER IA-ACNC: <0.2 UNITS (ref 0–0.9)

## 2024-11-21 RX ORDER — METRONIDAZOLE 500 MG/1
500 TABLET ORAL 2 TIMES DAILY
Qty: 14 TABLET | Refills: 0 | Status: SHIPPED | OUTPATIENT
Start: 2024-11-21 | End: 2024-11-28

## 2024-11-25 ENCOUNTER — OFFICE VISIT (OUTPATIENT)
Dept: NEUROLOGY | Facility: CLINIC | Age: 34
End: 2024-11-25
Payer: MEDICAID

## 2024-11-25 ENCOUNTER — LAB (OUTPATIENT)
Dept: LAB | Facility: HOSPITAL | Age: 34
End: 2024-11-25
Payer: MEDICAID

## 2024-11-25 VITALS
SYSTOLIC BLOOD PRESSURE: 100 MMHG | DIASTOLIC BLOOD PRESSURE: 58 MMHG | WEIGHT: 118.4 LBS | BODY MASS INDEX: 19.73 KG/M2 | HEIGHT: 65 IN | HEART RATE: 92 BPM | OXYGEN SATURATION: 99 %

## 2024-11-25 DIAGNOSIS — R53.1 WEAKNESS: ICD-10-CM

## 2024-11-25 DIAGNOSIS — R20.0 NUMBNESS AND TINGLING: Primary | ICD-10-CM

## 2024-11-25 DIAGNOSIS — R20.0 NUMBNESS AND TINGLING OF LEFT ARM AND LEG: ICD-10-CM

## 2024-11-25 DIAGNOSIS — R20.2 NUMBNESS AND TINGLING OF LEFT ARM AND LEG: ICD-10-CM

## 2024-11-25 DIAGNOSIS — R20.2 NUMBNESS AND TINGLING: Primary | ICD-10-CM

## 2024-11-25 DIAGNOSIS — G54.1 GENITOFEMORAL NEUROPATHY: ICD-10-CM

## 2024-11-25 PROCEDURE — 86043 ACETYLCHOLN RCPTR MODLG ANTB: CPT

## 2024-11-25 PROCEDURE — 82746 ASSAY OF FOLIC ACID SERUM: CPT

## 2024-11-25 PROCEDURE — 1159F MED LIST DOCD IN RCRD: CPT | Performed by: NURSE PRACTITIONER

## 2024-11-25 PROCEDURE — 86617 LYME DISEASE ANTIBODY: CPT

## 2024-11-25 PROCEDURE — 99214 OFFICE O/P EST MOD 30 MIN: CPT | Performed by: NURSE PRACTITIONER

## 2024-11-25 PROCEDURE — 86041 ACETYLCHOLN RCPTR BNDNG ANTB: CPT

## 2024-11-25 PROCEDURE — 86362 MOG-IGG1 ANTB CBA EACH: CPT

## 2024-11-25 PROCEDURE — 86053 AQAPRN-4 ANTB FLO CYTMTRY EA: CPT

## 2024-11-25 PROCEDURE — 1160F RVW MEDS BY RX/DR IN RCRD: CPT | Performed by: NURSE PRACTITIONER

## 2024-11-25 PROCEDURE — 36415 COLL VENOUS BLD VENIPUNCTURE: CPT

## 2024-11-25 PROCEDURE — 82550 ASSAY OF CK (CPK): CPT

## 2024-11-25 PROCEDURE — 86366 MUSCLE-SPECIFIC KINASE ANTB: CPT

## 2024-11-25 PROCEDURE — 82607 VITAMIN B-12: CPT

## 2024-11-25 PROCEDURE — 86042 ACETYLCHOLN RCPTR BLCKG ANTB: CPT
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
## 2024-11-26 ENCOUNTER — PATIENT ROUNDING (BHMG ONLY) (OUTPATIENT)
Dept: NEUROLOGY | Facility: CLINIC | Age: 34
End: 2024-11-26
Payer: MEDICAID

## 2024-11-26 LAB
CK SERPL-CCNC: 120 U/L (ref 20–180)
FOLATE SERPL-MCNC: >20 NG/ML (ref 4.78–24.2)
VIT B12 BLD-MCNC: 872 PG/ML (ref 211–946)

## 2024-11-28 LAB — MOG AB SER QL CBA IFA: NEGATIVE

## 2024-11-29 LAB

## 2024-12-04 LAB — AQP4 H2O CHANNEL IGG SERPL QL: NEGATIVE

## 2024-12-05 LAB
ACHR BIND AB SER-SCNC: <0.03 NMOL/L (ref 0–0.24)
ACHR BLOCK AB SER-ACNC: 16 % (ref 0–25)
ACHR MOD AB SER QL FC: 0 % (ref 0–45)

## 2024-12-08 LAB — MUSK AB SER IA-ACNC: <1 U/ML

## 2024-12-09 ENCOUNTER — TELEPHONE (OUTPATIENT)
Dept: NEUROLOGY | Facility: CLINIC | Age: 34
End: 2024-12-09
Payer: MEDICAID

## 2024-12-09 NOTE — TELEPHONE ENCOUNTER
Called patient and gave results.    ----- Message from Venus James sent at 12/9/2024 12:56 PM EST -----  Please notify pt labs for musk antibody, myasthenia gravis, autoimmune diseases that mimic MS, vit b12, folate, lyme disease, and muscle breakdown are all normal.

## 2024-12-24 ENCOUNTER — HOSPITAL ENCOUNTER (OUTPATIENT)
Dept: MRI IMAGING | Facility: HOSPITAL | Age: 34
Discharge: HOME | End: 2024-12-24
Payer: MEDICAID

## 2024-12-24 DIAGNOSIS — R20.0 NUMBNESS AND TINGLING: ICD-10-CM

## 2024-12-24 DIAGNOSIS — R20.2 NUMBNESS AND TINGLING: ICD-10-CM

## 2024-12-24 PROCEDURE — 72156 MRI NECK SPINE W/O & W/DYE: CPT

## 2024-12-24 PROCEDURE — 70553 MRI BRAIN STEM W/O & W/DYE: CPT

## 2024-12-24 PROCEDURE — A9577 INJ MULTIHANCE: HCPCS | Performed by: NURSE PRACTITIONER

## 2024-12-24 PROCEDURE — 25510000002 GADOBENATE DIMEGLUMINE 529 MG/ML SOLUTION: Performed by: NURSE PRACTITIONER

## 2024-12-24 RX ADMIN — GADOBENATE DIMEGLUMINE 10 ML: 529 INJECTION, SOLUTION INTRAVENOUS at 11:04

## 2025-01-27 ENCOUNTER — PATIENT OUTREACH (OUTPATIENT)
Dept: CASE MANAGEMENT | Facility: OTHER | Age: 35
End: 2025-01-27
Payer: MEDICAID

## 2025-01-27 ENCOUNTER — PATIENT MESSAGE (OUTPATIENT)
Dept: CASE MANAGEMENT | Facility: OTHER | Age: 35
End: 2025-01-27
Payer: MEDICAID

## 2025-01-31 NOTE — OUTREACH NOTE
AMBULATORY CASE MANAGEMENT NOTE    Names and Relationships of Patient/Support Persons: Contact: Josephine Taylor; Relationship: Self -     Patient Outreach    RN-ACM received return communication from patient via PPTVt - see below:        RN-ACM will follow-up with scheduled outreach to review follow-up visits and testing.        Leena GARZA  Ambulatory Case Management    1/31/2025, 16:16 EST

## 2025-02-15 ENCOUNTER — HOSPITAL ENCOUNTER (EMERGENCY)
Facility: HOSPITAL | Age: 35
Discharge: HOME | End: 2025-02-15
Attending: EMERGENCY MEDICINE
Payer: MEDICAID

## 2025-02-15 ENCOUNTER — APPOINTMENT (OUTPATIENT)
Dept: GENERAL RADIOLOGY | Facility: HOSPITAL | Age: 35
End: 2025-02-15
Payer: MEDICAID

## 2025-02-15 VITALS
DIASTOLIC BLOOD PRESSURE: 62 MMHG | WEIGHT: 115 LBS | HEIGHT: 65 IN | HEART RATE: 75 BPM | SYSTOLIC BLOOD PRESSURE: 98 MMHG | RESPIRATION RATE: 18 BRPM | BODY MASS INDEX: 19.16 KG/M2 | OXYGEN SATURATION: 93 % | TEMPERATURE: 98.8 F
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
## 2025-02-15 DIAGNOSIS — S52.502A CLOSED FRACTURE OF DISTAL END OF LEFT RADIUS, UNSPECIFIED FRACTURE MORPHOLOGY, INITIAL ENCOUNTER: Primary | ICD-10-CM

## 2025-02-15 PROCEDURE — 63710000001 ONDANSETRON ODT 4 MG TABLET DISPERSIBLE: Performed by: PHYSICIAN ASSISTANT

## 2025-02-15 PROCEDURE — 99283 EMERGENCY DEPT VISIT LOW MDM: CPT

## 2025-02-15 PROCEDURE — 96374 THER/PROPH/DIAG INJ IV PUSH: CPT

## 2025-02-15 PROCEDURE — 73110 X-RAY EXAM OF WRIST: CPT

## 2025-02-15 PROCEDURE — 25010000002 FENTANYL CITRATE (PF) 50 MCG/ML SOLUTION: Performed by: EMERGENCY MEDICINE

## 2025-02-15 RX ORDER — FENTANYL CITRATE 50 UG/ML
25 INJECTION, SOLUTION INTRAMUSCULAR; INTRAVENOUS ONCE
Status: COMPLETED | OUTPATIENT
Start: 2025-02-15 | End: 2025-02-15

## 2025-02-15 RX ORDER — ONDANSETRON 4 MG/1
4 TABLET, ORALLY DISINTEGRATING ORAL ONCE
Status: COMPLETED | OUTPATIENT
Start: 2025-02-15 | End: 2025-02-15

## 2025-02-15 RX ORDER — HYDROCODONE BITARTRATE AND ACETAMINOPHEN 5; 325 MG/1; MG/1
1 TABLET ORAL ONCE
Status: COMPLETED | OUTPATIENT
Start: 2025-02-15 | End: 2025-02-15

## 2025-02-15 RX ORDER — OXYCODONE AND ACETAMINOPHEN 5; 325 MG/1; MG/1
1 TABLET ORAL EVERY 6 HOURS PRN
Qty: 12 TABLET | Refills: 0 | Status: SHIPPED | OUTPATIENT
Start: 2025-02-15 | End: 2025-02-19

## 2025-02-15 RX ADMIN — HYDROCODONE BITARTRATE AND ACETAMINOPHEN 1 TABLET: 5; 325 TABLET ORAL at 20:09

## 2025-02-15 RX ADMIN — ONDANSETRON 4 MG: 4 TABLET, ORALLY DISINTEGRATING ORAL at 21:34

## 2025-02-15 RX ADMIN — FENTANYL CITRATE 25 MCG: 50 INJECTION, SOLUTION INTRAMUSCULAR; INTRAVENOUS at 21:40

## 2025-02-16 NOTE — DISCHARGE INSTRUCTIONS
Symptomatic care is recommended. Take all medications as prescribed and instructed. Follow up with [facility] as directed or return to Emergency Department with worsening of symptoms.

## 2025-02-16 NOTE — ED PROVIDER NOTES
EMERGENCY DEPARTMENT ENCOUNTER    Pt Name: Josephine Taylor  MRN: [id number]  Pt :   1990  Room Number:    Date of encounter:  2/15/2025  PCP: Faye Bee MD  ED Provider: WALESKA Blanca    Historian: Patient    HPI:  Chief Complaint: Left Hand Pain from fall    Context: Josephine Taylor is a 34 y.o. female who presents to the ED c/o left hand pain following a fall. Initially, she reports slipping and falling onto an outstretched arm, with all weight impacting the left side. There is no history of previous injuries to her left wrist. Upon examination, an abnormality is noted in her left wrist. Pulses are checked and found to be normal. Sensation is intact as she feels touch, and the ability to wiggle fingers is present. She has not taken any medication for pain prior to arrival. Transportation to the facility was provided by her son.  HPI     REVIEW OF SYSTEMS  A chief complaint appropriate review of systems was completed and is negative except as noted in the HPI.     PAST MEDICAL HISTORY  Past Medical History:   Diagnosis Date    Anxiety     Arthritis     Back problem     Bronchitis     Chronic abdominal pain     Chronic nausea     Chronic pain disorder 2004    Colitis     CTS (carpal tunnel syndrome)     CYP2D6 poor metabolizer     Depression     Difficulty walking     Fibromyalgia, primary     Foot laceration 2023    No ligament/tendon damage    Fractures rib 2007    Genitofemoral neuropathy     left    Headache, tension-type 2019    Insomnia     Joint pain 2014    Kidney infection     Low back pain 2014    Memory loss     Migraines     Neck pain 2019    Panic disorder     Postconcussion syndrome     PTSD (post-traumatic stress disorder)     Scoliosis     Traumatic brain injury 2019    Vitamin D deficiency        PAST SURGICAL HISTORY  Past Surgical History:   Procedure Laterality Date    BREAST BIOPSY Left     bx and excisional    BREAST EXCISIONAL BIOPSY Left  2024     SECTION      ILIAC ARTERY STENT      OTHER SURGICAL HISTORY Left 2008    renal vein embolization    TRIGGER POINT INJECTION      VASCULAR SURGERY  [id number]    Embolism       FAMILY HISTORY  Family History   Problem Relation Age of Onset    Anxiety disorder Mother     Hypertension Mother     Arthritis Mother     Mental illness Mother     Lupus Mother     Fibromyalgia Mother     Depression Father     COPD Father     Kidney disease Father     Heart disease Father     Heart attack Father     Mental illness Father     Heart failure Father     Alcohol abuse Father     Anxiety disorder Sister     Migraines Sister     Depression Sister     Depression Sister     Anxiety disorder Sister     ADD / ADHD Sister     Asthma Son     Melanoma Maternal Grandmother     Brain cancer Maternal Grandfather     Colon cancer Neg Hx     Colon polyps Neg Hx     Esophageal cancer Neg Hx     Ovarian cancer Neg Hx     Endometrial cancer Neg Hx     Breast cancer Neg Hx        SOCIAL HISTORY  Social History     Socioeconomic History    Marital status:    Tobacco Use    Smoking status: Former     Current packs/day: 0.00     Average packs/day: 0.5 packs/day for 18.4 years (9.2 ttl pk-yrs)     Types: Cigarettes     Start date: 2005     Quit date: 2023     Years since quittin.6    Smokeless tobacco: Never   Vaping Use    Vaping status: Every Day    Substances: Nicotine, Flavoring   Substance and Sexual Activity    Alcohol use: Never    Drug use: Yes     Types: Marijuana     Comment: Pt states that she smokes about a few times a week.    Sexual activity: Yes     Partners: Male     Birth control/protection: None       ALLERGIES  Galcanezumab-gnlm, Aimovig [erenumab-aooe], Gabapentin, Sulfa antibiotics, Venlafaxine, and Ciprofloxacin    PHYSICAL EXAM  Physical Exam  Vitals and nursing note reviewed.   Constitutional:       General: She is not in acute distress.     Appearance: Normal appearance. She is not  ill-appearing or toxic-appearing.   HENT:      Head: Normocephalic.      Nose: Nose normal.      Mouth/Throat:      Mouth: Mucous membranes are moist.   Eyes:      Extraocular Movements: Extraocular movements intact.   Cardiovascular:      Rate and Rhythm: Normal rate.      Pulses: Normal pulses.   Pulmonary:      Effort: Pulmonary effort is normal.   Musculoskeletal:      Right wrist: Normal.      Left wrist: Swelling, deformity, tenderness and bony tenderness present. No snuff box tenderness. Decreased range of motion. Normal pulse.      Cervical back: Normal range of motion.   Skin:     General: Skin is warm and dry.   Neurological:      General: No focal deficit present.      Mental Status: She is alert.   Psychiatric:         Mood and Affect: Mood normal.         Behavior: Behavior normal.       LAB RESULTS    If labs were ordered, I independently reviewed the results and considered them in treating the patient.    RADIOLOGY  XR Wrist 3+ View Left   Final Result   Impression:   Comminuted nondisplaced distal radial fracture.               Electronically Signed: Nam Goodwin MD     2/15/2025 9:08 PM EST     Workstation ID: [id number]        [x] Radiologist's Report Reviewed:  I ordered and independently interpreted the above noted radiographic studies.  See radiologist's dictation for official interpretation.      PROCEDURES    Splint - Cast - Strapping    Date/Time: 2/15/2025 10:00 PM    Performed by: Tristan Jackson PA  Authorized by: Linwood Nair MD    Consent:     Consent obtained:  Verbal    Consent given by:  Patient    Risks, benefits, and alternatives were discussed: yes      Risks discussed:  Discoloration, numbness, pain and swelling    Alternatives discussed:  No treatment and referral  Universal protocol:     Patient identity confirmed:  Verbally with patient  Pre-procedure details:     Distal neurologic exam:  Normal    Distal perfusion: distal pulses strong and brisk capillary refill    Procedure  details:     Location:  Wrist    Wrist location:  L wrist    Strapping: no      Cast type:  Short arm    Splint type:  Volar short arm and wrist    Supplies:  Sling and fiberglass    Attestation: Splint applied and adjusted personally by me    Post-procedure details:     Distal neurologic exam:  Normal    Distal perfusion: distal pulses strong and brisk capillary refill      Procedure completion:  Tolerated      No orders to display       MEDICATIONS GIVEN IN ER    Medications   HYDROcodone-acetaminophen (NORCO) 5-325 MG per tablet 1 tablet (1 tablet Oral Given 2/15/25 2009)   ondansetron ODT (ZOFRAN-ODT) disintegrating tablet 4 mg (4 mg Oral Given 2/15/25 2134)   fentaNYL citrate (PF) (SUBLIMAZE) injection 25 mcg (25 mcg Intravenous Given 2/15/25 2140)       MEDICAL DECISION MAKING, PROGRESS, and CONSULTS   Medical Decision Making  34-year-old female presents ED for evaluation following a fall with complaints of left wrist pain.  Imaging of left wrist demonstrates comminuted nondisplaced distal radial fracture.  Neurovascularly intact.  Splint applied by myself at bedside.  Orthopedic hand consulted who will see patient in follow-up.  Patient provided instructions at discharge.  Medications sent to patient's pharmacy for pain management.  Discharged with clear return precautions.    Problems Addressed:  Closed fracture of distal end of left radius, unspecified fracture morphology, initial encounter: complicated acute illness or injury  Fall, initial encounter: complicated acute illness or injury    Amount and/or Complexity of Data Reviewed  Radiology: ordered and independent interpretation performed. Decision-making details documented in ED Course.    Risk  Prescription drug management.      Discussion below represents my analysis of pertinent findings related to patient's condition, differential diagnosis, treatment plan and final disposition.    Differential diagnosis: Presenting symptoms suggest a possible wrist  injury, potentially a fracture or sprain, given the mechanism of injury and the presence of an abnormality. Differential diagnoses may include a distal radius fracture or a wrist sprain. Additional differential diagnosis include but are not limited to: Contusion, arthritis, dislocation, tendon/ligamentous injury.      Additional sources  Discussed/ obtained information from independent historians:   [] Spouse  [] Parent  [] Family member  [] Friend  [] EMS   [] Other:  External (non-ED) record review:   [] Inpatient record:   [] Office record:   [] Outpatient record:   [] Prior Outpatient labs:   [] Prior Outpatient radiology:   [] Primary Care record:   [] Outside ED record:   [] Other:   Patient's care impacted by:   [] Diabetes  [] Hypertension  [] Hyperlipidemia  [] Hypothyroidism   [] Coronary Artery Disease  [] Congestive Heart Failure   [] COPD   [] Cancer   [] Obesity  [] GERD   [] Tobacco Abuse   [] Substance Abuse    [x] Anxiety   [x] Depression   [x] Other: Fibromyalgia  Care significantly affected by Social Determinants of Health (housing and economic circumstances, unemployment)    [] Yes     [x] No   If yes, Patient's care significantly limited by  Social Determinants of Health including:   [] Inadequate housing   [] Low income   [] Alcoholism and drug addiction in family   [] Problems related to primary support group   [] Unemployment   [] Problems related to employment   [] Other Social Determinants of Health:     Orders placed during this visit:  Orders Placed This Encounter   Procedures    Splint Cast Strapping    XR Wrist 3+ View Left   I considered prescription management  with:   [x] Pain medication: Implemented as prescribed for treatment of acute fracture  [] Antiviral  [] Antibiotic   [] Other:   Rationale: ZAIN query complete. Treatment plan to include limited course of prescribed  controlled substance. Risks including addiction, benefits, and alternatives presented to patient.    ED  Course:    ED Course as of 02/16/25 0509   Sat Feb 15, 2025   1952 Vitals and Telemetry tracing was reviewed and directly interpreted by myself demonstrating blood pressure 109/73, temperature 98.8 °F, heart rate of 93, respirations 18 breaths/min and oxygen saturation 97% room air [JG]   1953 BP: 109/73 [JG]   1953 Temp: 98.8 °F (37.1 °C) [JG]   1953 Heart Rate: 93 [JG]   1953 Resp: 18 [JG]   1953 SpO2: 97 % [JG]   2121 XR Wrist 3+ View Left  XR left wrist personally interpreted by myself and with official read provided by radiology demonstrates comminuted nondisplaced distal radial fracture. [JG]      ED Course User Index  [J] Tristan Jackson, PA          DIAGNOSIS  Final diagnoses:   Closed fracture of distal end of left radius, unspecified fracture morphology, initial encounter   Fall, initial encounter     DISPOSITION    ED Disposition       ED Disposition   Discharge    Condition   Stable    Comment   --           DISCHARGE    Patient discharged in stable condition.    Reviewed implications of results, diagnosis, meds, responsibility to follow up, warning signs and symptoms of possible worsening, potential complications and reasons to return to ER.    Patient/Family voiced understanding of above instructions.    Discussed plan for discharge, as there is no emergent indication for admission.  Pt/family is agreeable and understands need for follow up and possible repeat testing.  Pt/family is aware that discharge does not mean that nothing is wrong but that it indicates no emergency is currently present that requires admission and they must continue care with follow-up as given below or with a physician of their choice.     FOLLOW-UP  Ree Dailey MD  [address] [id number] [phone]      Call for follow up with Orthopedic Hand    Tanvir, Hector Hearn MD  [facility] [id number] [phone]      Call for follow up with Orthopedic  [facility] EMERGENCY DEPARTMENT  [address]  [facility] [id number] [phone]  Go to   If symptoms worsen         Medication List        New Prescriptions      oxyCODONE-acetaminophen 5-325 MG per tablet  Commonly known as: PERCOCET  Take 1 tablet by mouth Every 6 (Six) Hours As Needed for Moderate Pain for up to 3 days.               Where to Get Your Medications        These medications were sent to [facility]/pharmacy #[id number] - [place], [place] - [address] [place] [address] AT [facility] [place] - [phone]  - [phone] [address] [address]      Phone: [phone]   oxyCODONE-acetaminophen 5-325 MG per tablet        Please note that portions of this document were completed with voice recognition software.        Tristan Jackson, PA  02/16/25 0509

## 2025-02-19 ENCOUNTER — TELEPHONE (OUTPATIENT)
Dept: NEUROLOGY | Facility: CLINIC | Age: 35
End: 2025-02-19
Payer: MEDICAID

## 2025-02-19 ENCOUNTER — OFFICE VISIT (OUTPATIENT)
Dept: ORTHOPEDIC SURGERY | Facility: CLINIC | Age: 35
End: 2025-02-19
Payer: MEDICAID

## 2025-02-19 VITALS
WEIGHT: 115 LBS | HEIGHT: 65 IN | SYSTOLIC BLOOD PRESSURE: 100 MMHG | BODY MASS INDEX: 19.16 KG/M2 | DIASTOLIC BLOOD PRESSURE: 82 MMHG

## 2025-02-19 DIAGNOSIS — M25.532 LEFT WRIST PAIN: ICD-10-CM

## 2025-02-19 DIAGNOSIS — S52.532A CLOSED COLLES' FRACTURE OF LEFT RADIUS, INITIAL ENCOUNTER: Primary | ICD-10-CM

## 2025-02-19 RX ORDER — OXYCODONE HYDROCHLORIDE 5 MG/1
5 TABLET ORAL EVERY 6 HOURS PRN
Qty: 20 TABLET | Refills: 0 | Status: SHIPPED | OUTPATIENT
Start: 2025-02-19

## 2025-02-19 RX ORDER — SENNOSIDES 8.6 MG
650 CAPSULE ORAL EVERY 8 HOURS PRN
Qty: 30 TABLET | Refills: 0 | Status: SHIPPED | OUTPATIENT
Start: 2025-02-19

## 2025-02-19 NOTE — PROGRESS NOTES
[facility] Orthopedic     Office Visit       Date: 02/19/2025   Patient Name: Josephine Taylor  MRN: [id number]  YOB: 1990    Referring Physician: Referring, Self     Chief Complaint:   Chief Complaint   Patient presents with    Left Wrist - Pain, Initial Evaluation     DOI 02/15/2025       History of Present Illness:   Josephine Taylor is a 34 y.o. female right-hand-dominant presents with left wrist pain of 4 days duration.  Patient reports she fell onto her left wrist on 2/15/2025.  Reports immediate pain and swelling.  Presented the emergency department and had x-ray which demonstrated left distal radius fracture.  Patient reports her pain is 8-10 out of 10.  She has been immobilizing and using anti-inflammatories.  She is otherwise healthy.  She denies smoking.      Subjective   Review of Systems:   Review of Systems   Constitutional:  Negative for chills, fever, unexpected weight gain and unexpected weight loss.   HENT:  Negative for congestion, postnasal drip and rhinorrhea.    Eyes:  Negative for blurred vision.   Respiratory:  Negative for shortness of breath.    Cardiovascular:  Negative for leg swelling.   Gastrointestinal:  Negative for abdominal pain, nausea and vomiting.   Genitourinary:  Negative for difficulty urinating.   Musculoskeletal:  Positive for arthralgias. Negative for gait problem, joint swelling and myalgias.   Skin:  Negative for skin lesions and wound.   Neurological:  Negative for dizziness, weakness, light-headedness and numbness.   Hematological:  Does not bruise/bleed easily.   Psychiatric/Behavioral:  Negative for depressed mood.         Pertinent review of systems per HPI.     I reviewed the patient's chief complaint, history of present illness, review of systems, past medical history, surgical history, family history, social history, medications and allergy list in the EMR on 02/19/2025  "and agree with the findings above.    Objective    Vital Signs:   Vitals:    02/19/25 1006   BP: 100/82   Weight: 52.2 kg (115 lb)   Height: 165.1 cm (65\")     BMI: Body mass index is 19.14 kg/m².    General Appearance: No acute distress. Alert and oriented.     Chest:  Non-labored breathing on room air. Regular rate and rhythm.    Upper Extremity Exam:    Left wrist without obvious deformity.  Swelling over the left distal radius.  Tender palpation of the left distal radius.    Fingers are warm, well-perfused with appropriate capillary refill.  Palpable radial pulse.    Sensation intact to light touch in median, radial and ulnar nerve distributions.    Motor- Fires FPL, ulnar intrinsics, EPL/EDC w/ full active and passive range of motion. Strength intact.    Non-tender except for in the areas highlighted    Imaging/Studies:   Imaging Results (Last 24 Hours)       Procedure Component Value Units Date/Time    XR Wrist 3+ View Left [[id number]] Resulted: 02/19/25 1027     Updated: 02/19/25 1027    Narrative:      Left Wrist X-Ray    Indication: Pain    Views:  AP, Lateral, and Oblique     Comparison:  2/15/24    Findings:  Minimally displaced left distal radius fracture  No bony lesion  Normal soft tissues  Normal joint spaces    Impression:   Minimally displaced left distal radius fracture          X-ray of the left wrist from 2/15/2025 was independently reviewed and interpreted myself demonstrates a minimally displaced left distal radius fracture      Procedures:  Procedures    Quality Measures:   ACP:   ACP discussion was deferred.    Tobacco:   Josephine Taylor  reports that she quit smoking about 20 months ago. Her smoking use included cigarettes. She started smoking about 20 years ago. She has a 9.2 pack-year smoking history. She has never used smokeless tobacco.      Assessment / Plan    Assessment/Plan:     There are no diagnoses linked to this encounter.     Josephine Tayloris a 34 y.o. female who " presents with:      ICD-10-CM ICD-9-CM   1. Closed Colles' fracture of left radius, initial encounter  S52.532A 813.41   2. Left wrist pain  M25.532 719.43         Patient presents with left distal radius fracture is minimally displaced.  Based on the fracture pattern as well as with patient admission activity level recommend nonoperative treatment.  Recommend immobilization in a left short arm cast for 4 weeks.  Recommend referral to physical therapy to begin finger range of motion.  Recommend x-rays out of cast and transition to removable brace at 4 weeks and then referral to hand therapy.  Recommend ice elevation, Tylenol and oxycodone for pain as needed.    Follow Up:   Return in about 4 weeks (around 3/19/2025) for F/U with Alyssa.        Hector Ramey MD  [facility] [facility]

## 2025-02-27 ENCOUNTER — OFFICE VISIT (OUTPATIENT)
Dept: NEUROLOGY | Facility: CLINIC | Age: 35
End: 2025-02-27
Payer: MEDICAID

## 2025-02-27 ENCOUNTER — PROCEDURE VISIT (OUTPATIENT)
Dept: NEUROLOGY | Facility: CLINIC | Age: 35
End: 2025-02-27
Payer: MEDICAID

## 2025-02-27 VITALS
BODY MASS INDEX: 19.16 KG/M2 | SYSTOLIC BLOOD PRESSURE: 104 MMHG | DIASTOLIC BLOOD PRESSURE: 62 MMHG | HEART RATE: 82 BPM | HEIGHT: 65 IN | OXYGEN SATURATION: 97 % | WEIGHT: 115 LBS

## 2025-02-27 DIAGNOSIS — R20.0 NUMBNESS AND TINGLING: ICD-10-CM

## 2025-02-27 DIAGNOSIS — R20.2 NUMBNESS AND TINGLING: Primary | ICD-10-CM

## 2025-02-27 DIAGNOSIS — R20.2 NUMBNESS AND TINGLING: ICD-10-CM

## 2025-02-27 DIAGNOSIS — R20.0 NUMBNESS AND TINGLING: Primary | ICD-10-CM

## 2025-02-27 NOTE — PROGRESS NOTES
[facility]   Electrodiagnostic Laboratory    Nerve Conduction & EMG Report        Patient: Josephine Taylor   Patient ID: [id number]   YOB: 1990  Sex: female      Exam Physician:  Jos Stone MD  Refer Physician:  TRAVON Tolliver*    Electromyogram and Nerve Conduction Velocity Procedure Note    Hx: 34 y.o. right handed female with complaint of numbness involving the the upper and lower extremities. Symptoms have been present for several months and were provoked by no clear event. Significant past medical history includes nothing suggestive of neuropathy.  Family history no family history of nerve or muscle disease.    Exam: Motor power is normal. There is no atrophy. There are no fasciculations. Deep tendon reflexes are present and symmetrical. Sensory exam is normal.      Edx studies of the L UE and LE were performed to evaluate for peripheral neuropathy.     NCS Examination   For sensory nerve conduction studies, the amplitude is measured peak-to-peak, the latency reported is the distal peak latency, and the conduction velocity, if measured, is determined from onset latencies and is over the forearm.   For motor nerve conduction studies, the amplitude is measured baseline-to-peak, the latency reported is the distal onset latency, the conduction velocity is calculated over the forearm, and the F wave latency is the minimum latency.   Unless otherwise noted, the hand temperature was monitored continuously and remained between 32°C and 36°C during the performance of the NCSs.          Nerve Conduction Studies  Anti Sensory Summary Table     Stim Site NR Norm Peak (ms) O-P Amp (µV) Norm O-P Amp Onset (ms) Site1 Site2 Delta-0 (ms) Dist (cm) Elijah (m/s) Norm Elijah (m/s)   Right Median Anti Sensory (2nd Digit)   Wrist    <3.6 19.7 >10 2.5 Wrist 2nd Digit 2.5 14.0 56    Right Radial Anti Sensory (Base 1st Digit)   Wrist    <3.1 10.9  1.6 Wrist Base 1st Digit 1.6 0.0      Right Sural Anti Sensory (Lat Mall)   Calf    <4.0 7.1 >5.0 1.7 Calf Lat Mall 1.7 14.0 82    Right Ulnar Anti Sensory (5th Digit)   Wrist    <3.7 31.9 >15.0 2.1 Wrist 5th Digit 2.1 0.0       Motor Summary Table     Stim Site NR Onset (ms) Norm Onset (ms) O-P Amp (mV) Norm O-P Amp Site1 Site2 Delta-0 (ms) Dist (cm) Elijah (m/s) Norm Elijah (m/s)   Right Fibular Motor (Ext Dig Brev)   Ankle    4.7 <6.1 4.3 >2.5 B Fib Ankle 6.7 38.0 57 >40   B Fib    11.4  4.1  Poplt B Fib 1.4 9.0 64 >40   Poplt    12.8  1.2          Right Median Motor (Abd Poll Brev)   Wrist    3.0 <4.2 10.1 >5 Elbow Wrist 3.5 23.0 66 >50   Elbow    6.5  4.3          Right Tibial Motor (Abd Thomas Brev)   Ankle    4.1 <6.1 10.7 >3.0 Knee Ankle 7.8 40.0 51 >40   Knee    11.9  4.2          Right Ulnar Motor (Abd Dig Minimi)   Wrist    2.0 <4.2 5.3 >3 B Elbow Wrist 3.3 22.0 67 >53   B Elbow    5.3  4.9  A Elbow B Elbow 1.7 9.0 53 >53   A Elbow    7.0  4.7            F Wave Studies     NR F-Lat (ms) Lat Norm (ms) L-R F-Lat (ms) L-R Lat Norm   Right Fibular (Mrkrs) (EDB)      52.50 <60  <5.1   Right Median (Mrkrs) (Abd Poll Brev)      25.86 <33  <2.2   Right Tibial (Mrkrs) (Abd Hallucis)      51.72 <61  <5.7   Right Ulnar (Mrkrs) (Abd Dig Min)      25.47 <36  <2.5     H Reflex Studies     NR H-Lat (ms) L-R H-Lat (ms) L-R Lat Norm   Right Tibial (Mrkrs) (Gastroc)      30.34  <2.0     EMG Examination   The study was performed with a concentric needle electrode. Fibrillation and fasciculation activity is graded from none (0) to continuous (4+). The configuration and recruitment pattern of motor unit action potentials under voluntary control, if not normal, are described below        Side Muscle Nerve Root Ins Act Fibs Psw Amp Dur Poly Recrt Int Pat Comment   Right Deltoid Axillary C5-6 Nml Nml Nml Nml Nml 0 Nml Nml    Right Triceps Radial C6-7-8 Nml Nml Nml Nml Nml 0 Nml Nml    Right Biceps Musculocut C5-6 Nml Nml Nml Nml Nml 0 Nml Nml    Right PronatorTeres Median  C6-7 Nml Nml Nml Nml Nml 0 Nml Nml    Right 1stDorInt Ulnar C8-T1 Nml Nml Nml Nml Nml 0 Nml Nml    Right AntTibialis Dp Br Fibular L4-5 Nml Nml Nml Nml Nml 0 Nml Nml    Right Gastroc Tibial S1-2 Nml Nml Nml Nml Nml 0 Nml Nml    Right BicepsFemL Sciatic L5-S2 Nml Nml Nml Nml Nml 0 Nml Nml    Right VastusMed Femoral L2-4 Nml Nml Nml Nml Nml 0 Nml Nml    Right Iliopsoas Femoral L2-3 Nml Nml Nml Nml Nml 0 Nml Nml          NCV FINDINGS:  All nerve conduction studies (as indicated in the following tables) were within normal limits.  All F Wave latencies were within normal limits.    EMG FINDINGS:  All examined muscles (as indicated in the following table) showed no evidence of electrical instability.    Conclusion: Normal NCV and EMG of the right upper extremity and right lower extremity          Instrument used:  Teca Synergy        Performed by:          Jos Stone MD
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
## 2025-02-28 DIAGNOSIS — G54.1 GENITOFEMORAL NEUROPATHY: Primary | ICD-10-CM

## 2025-03-03 ENCOUNTER — TELEPHONE (OUTPATIENT)
Dept: ORTHOPEDIC SURGERY | Facility: CLINIC | Age: 35
End: 2025-03-03
Payer: MEDICAID
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
## 2025-03-05 ENCOUNTER — LAB (OUTPATIENT)
Dept: LAB | Facility: HOSPITAL | Age: 35
End: 2025-03-05
Payer: MEDICAID

## 2025-03-05 ENCOUNTER — OFFICE VISIT (OUTPATIENT)
Dept: ORTHOPEDIC SURGERY | Facility: CLINIC | Age: 35
End: 2025-03-05
Payer: MEDICAID

## 2025-03-05 VITALS
SYSTOLIC BLOOD PRESSURE: 108 MMHG | BODY MASS INDEX: 19.16 KG/M2 | DIASTOLIC BLOOD PRESSURE: 70 MMHG | HEIGHT: 65 IN | WEIGHT: 115 LBS

## 2025-03-05 DIAGNOSIS — S52.532A CLOSED COLLES' FRACTURE OF LEFT RADIUS, INITIAL ENCOUNTER: ICD-10-CM

## 2025-03-05 DIAGNOSIS — G54.1 GENITOFEMORAL NEUROPATHY: ICD-10-CM

## 2025-03-05 DIAGNOSIS — M25.532 LEFT WRIST PAIN: Primary | ICD-10-CM

## 2025-03-05 PROCEDURE — 1159F MED LIST DOCD IN RCRD: CPT | Performed by: PLASTIC SURGERY

## 2025-03-05 PROCEDURE — 36415 COLL VENOUS BLD VENIPUNCTURE: CPT

## 2025-03-05 PROCEDURE — 1160F RVW MEDS BY RX/DR IN RCRD: CPT | Performed by: PLASTIC SURGERY

## 2025-03-05 PROCEDURE — 86592 SYPHILIS TEST NON-TREP QUAL: CPT

## 2025-03-05 PROCEDURE — 99024 POSTOP FOLLOW-UP VISIT: CPT | Performed by: PLASTIC SURGERY

## 2025-03-06 LAB — RPR SER QL: NORMAL

## 2025-03-12 ENCOUNTER — PATIENT OUTREACH (OUTPATIENT)
Dept: CASE MANAGEMENT | Facility: OTHER | Age: 35
End: 2025-03-12
Payer: MEDICAID

## 2025-03-12 NOTE — OUTREACH NOTE
AMBULATORY CASE MANAGEMENT NOTE    Names and Relationships of Patient/Support Persons: Contact: Josephine Taylor; Relationship: Self -     Patient Outreach    Scheduled outreach with patient for program progression. Patient reports over the last few weeks she has continued to experience migraines, dizziness, and intermittent numbness of different areas of her body. Pt states she is supposed to be having a spinal tap in a couple of weeks to further assess the cause of these symptoms and voices concern about post-procedure period as she has three large dogs at home that may jump on her, is unsure if her 17 yr old son will be home from school to help manage them. Pt's mother will provide transportation. Pt reports she has scoliosis and spinal stenosis, is worried this will cause a lot of pain during/after the spinal tap. ACM encouraged pt to discuss these and any other concerns with staff/providers and make sure they are aware of these conditions; also suggested pt might contact insurance provider to see if additional observation time after procedure could be covered if any recovery concerns are noted. Pt v/u to education provided, Care plan goals reviewed, updated. Next outreach scheduled.     Education Documentation  Coping Strategies, taught by Leena Villafuerte, RN at 3/12/2025  4:04 PM.  Learner: Patient  Readiness: Acceptance  Method: Explanation  Response: Verbalizes Understanding    Signs/Symptoms, taught by Leena Villafuerte RN at 3/12/2025  4:04 PM.  Learner: Patient  Readiness: Acceptance  Method: Explanation  Response: Verbalizes Understanding    Risk Factors, taught by Leena Villafuerte, RN at 3/12/2025  4:04 PM.  Learner: Patient  Readiness: Acceptance  Method: Explanation  Response: Verbalizes Understanding    Risk Factors, taught by Leena Villafuerte, RN at 3/12/2025  4:03 PM.  Learner: Patient  Readiness: Acceptance  Method: Explanation  Response: Verbalizes Understanding    Home Safety, taught by Noy  Leena RN at 3/12/2025  4:03 PM.  Learner: Patient  Readiness: Acceptance  Method: Explanation  Response: Verbalizes Understanding    Unresolved/Worsening Symptoms, taught by Leena Villafuerte RN at 3/12/2025  4:03 PM.  Learner: Patient  Readiness: Acceptance  Method: Explanation  Response: Verbalizes Understanding    Signs/Symptoms, taught by Leena Villafuerte RN at 3/12/2025  4:03 PM.  Learner: Patient  Readiness: Acceptance  Method: Explanation  Response: Verbalizes Understanding          Leena GARZA  Ambulatory Case Management    3/12/2025, 16:05 EDT

## 2025-03-13 ENCOUNTER — HOSPITAL ENCOUNTER (OUTPATIENT)
Dept: ULTRASOUND IMAGING | Facility: HOSPITAL | Age: 35
Discharge: HOME | End: 2025-03-13
Payer: MEDICAID

## 2025-03-13 ENCOUNTER — HOSPITAL ENCOUNTER (OUTPATIENT)
Dept: MAMMOGRAPHY | Facility: HOSPITAL | Age: 35
Discharge: HOME | End: 2025-03-13
Payer: MEDICAID

## 2025-03-13 DIAGNOSIS — R92.8 ABNORMAL MAMMOGRAM OF BOTH BREASTS: ICD-10-CM

## 2025-03-13 PROCEDURE — G0279 TOMOSYNTHESIS, MAMMO: HCPCS

## 2025-03-13 PROCEDURE — 76642 ULTRASOUND BREAST LIMITED: CPT

## 2025-03-13 PROCEDURE — 77066 DX MAMMO INCL CAD BI: CPT

## 2025-03-18 ENCOUNTER — TELEPHONE (OUTPATIENT)
Dept: INFUSION THERAPY | Facility: HOSPITAL | Age: 35
End: 2025-03-18
Payer: MEDICAID

## 2025-03-18 RX ORDER — ONDANSETRON 4 MG/1
4 TABLET, FILM COATED ORAL EVERY 8 HOURS PRN
COMMUNITY

## 2025-03-18 NOTE — TELEPHONE ENCOUNTER
Contacted patient as pre-procedure call prior to planned lumbar puncture scheduled for 3/24/25. Reviewed with patient arrival time, location,  needed,  nothing to eat after midnight but clear liquids okay, may take medications the morning of the procedure. Plan on being here for procedure. 4-5 hours so wear comfortable clothes. Reviewed medical history, medications, allergies and allowed time for questions.

## 2025-03-19 ENCOUNTER — TELEPHONE (OUTPATIENT)
Dept: ORTHOPEDIC SURGERY | Facility: CLINIC | Age: 35
End: 2025-03-19
Payer: MEDICAID

## 2025-03-19 NOTE — TELEPHONE ENCOUNTER
"Lesley from [facility] in [place] called, looking for PT protocol for this patient. After seeing that Dr. Ramey didn't order additional PT at her last visit on 3/5/25, I stepped into clinic and spoke with Marita VAIL. She looked over the 3/5/25 office note with me and in the assessment/plan section, we found this, \"Recommend continue immobilization for an additional 4 weeks. Recommend patient follow-up in 4 weeks for repeat exam. Will put in a referral for hand therapy at that time.\"    I relayed this information to Lesley and she verbalized understanding.     Patient is scheduled 4/9/25 with Alyssa for follow up.  "

## 2025-03-24 ENCOUNTER — HOSPITAL ENCOUNTER (OUTPATIENT)
Dept: GENERAL RADIOLOGY | Facility: HOSPITAL | Age: 35
Discharge: HOME | End: 2025-03-24
Payer: MEDICAID

## 2025-04-09 ENCOUNTER — OFFICE VISIT (OUTPATIENT)
Dept: ORTHOPEDIC SURGERY | Facility: CLINIC | Age: 35
End: 2025-04-09
Payer: MEDICAID

## 2025-04-09 VITALS
SYSTOLIC BLOOD PRESSURE: 110 MMHG | HEIGHT: 65 IN | DIASTOLIC BLOOD PRESSURE: 72 MMHG | WEIGHT: 115 LBS | BODY MASS INDEX: 19.16 KG/M2

## 2025-04-09 DIAGNOSIS — S52.532D CLOSED COLLES' FRACTURE OF LEFT RADIUS WITH ROUTINE HEALING, SUBSEQUENT ENCOUNTER: Primary | ICD-10-CM

## 2025-04-09 PROCEDURE — 99024 POSTOP FOLLOW-UP VISIT: CPT | Performed by: PHYSICIAN ASSISTANT

## 2025-04-09 RX ORDER — CLONAZEPAM 2 MG/1
TABLET ORAL
COMMUNITY
Start: 2025-03-17

## 2025-04-09 NOTE — PROGRESS NOTES
[facility] [place], [place] Orthopaedic Surgery Office Follow Up       Office Follow Up Visit       Patient Name: Josephine Taylor    Chief Complaint:   Chief Complaint   Patient presents with    Follow-up     1 month follow up - Closed Colles' fracture of left radius       Referring Physician: No ref. provider found    History of Present Illness:   Josephine Taylor returns to clinic today for follow-up left distal radius fracture.  She is doing physical therapy working on digit motion.  She has been in the [facility].  She reports minimal pain.      Subjective     Review of Systems   Constitutional: Negative.  Negative for chills, fatigue and fever.   HENT: Negative.  Negative for congestion and dental problem.    Eyes: Negative.  Negative for blurred vision.   Respiratory: Negative.  Negative for shortness of breath.    Cardiovascular: Negative.  Negative for leg swelling.   Gastrointestinal: Negative.  Negative for abdominal pain.   Endocrine: Negative.  Negative for polyuria.   Genitourinary: Negative.  Negative for difficulty urinating.   Musculoskeletal:  Positive for arthralgias.   Skin: Negative.    Allergic/Immunologic: Negative.    Neurological: Negative.    Hematological: Negative.  Negative for adenopathy.   Psychiatric/Behavioral: Negative.  Negative for behavioral problems.         I have reviewed and updated the following portions of the patient's history and review of systems: allergies, current medications, past family history, past medical history, past social history, past surgical history and problem list.    Medications:   Current Outpatient Medications:     5-Hydroxytryptophan (5-HTP PO), Place 1 patch on the skin as directed by provider Daily., Disp: , Rfl:     acetaminophen (TYLENOL) 650 MG 8 hr tablet, Take 1 tablet by mouth Every 8 (Eight) Hours As Needed for Mild Pain., Disp: 30 tablet, Rfl: 0    clonazePAM (KlonoPIN) 1 MG tablet, Take 1 tablet by mouth 3 (Three)  "Times a Day As Needed for Anxiety., Disp: , Rfl:     clonazePAM (KlonoPIN) 2 MG tablet, TAKE 1 TABLET BY MOUTH 2 TIMES A DAY AS NEEDED OR MAY BREAK IN HALF AND TAKE 1/2 UP TO 4 TIMES A DAY, Disp: , Rfl:     Coenzyme Q10 (CO Q 10 PO), Take  by mouth Daily., Disp: , Rfl:     Cyanocobalamin (VITAMIN B-12 PO), Take  by mouth Daily., Disp: , Rfl:     ondansetron (ZOFRAN) 4 MG tablet, Take 1 tablet by mouth Every 8 (Eight) Hours As Needed for Nausea or Vomiting., Disp: , Rfl:     QUERCETIN PO, Take  by mouth Daily., Disp: , Rfl:     VITAMIN D PO, Take  by mouth Daily., Disp: , Rfl:     Allergies:   Allergies   Allergen Reactions    Erenumab-Aooe Other (See Comments) and Anaphylaxis     Told to avoid due to hives, SOA with Emgality    Galcanezumab-Gnlm Shortness Of Breath and Anaphylaxis    Gabapentin Dizziness    Sulfa Antibiotics Hives    Venlafaxine Other (See Comments)     Worsened headaches and severe mood changes    Ciprofloxacin Unknown - High Severity         Objective      Vital Signs:   Vitals:    04/09/25 1151   BP: 110/72   Weight: 52.2 kg (115 lb)   Height: 165.1 cm (65\")       Ortho Exam:  Left wrist exam: Mildly tender at the distal radius.  Patient is unable to make a composite fist secondary to stiffness in the digits.  No pain.  The wrist itself is very stiff.  Digits well-perfused.  Pulses 2+    Results Review:  XR Wrist 3+ View Left  Left Wrist X-Ray    Indication: Pain    Views:  AP, Lateral, and Oblique     Comparison: 3/5/25    Findings:  There is a fracture of the distal radius with minimal displacement   evidence of bony consolidation at the fracture site.     Normal soft tissues  Normal joint spaces    Impression:  X-ray of the left wrist personally reviewed by me.  Fracture   of the distal radius with interval healing.       Mammo Diagnostic Digital Tomosynthesis Bilateral With CAD  Result Date: 3/13/2025  Palpable lump correlates with a normal ridge of tissue on the left at 11:00 8 cm from the " nipple. Stable appearance of postsurgical changes on the left.  ACR BI-RADS CATEGORY: 3, PROBABLY BENIGN  RECOMMENDATION: 12-month bilateral diagnostic mammogram is recommended to ensure stability of the previous biopsy site.  As no suspicious imaging correlate is identified for the patient's palpable area of concern, further clinical evaluation correlation is recommended. A repeat clinical breast exam in 2-3 months time can be considered based on clinical concern. Further decision to pursue biopsy should also be decided on clinical exam findings. It was discussed with the patient at the area were to become firmer or larger that she should return to her physician.  CAD was utilized.  The standard false-negative rate of mammography is between 10% and 25%. Complex patterns or increased breast density will markedly elevate the false-negative rate of mammography.   A letter, in lay terminology, with the results of this exam was given to the patient at the time of the visit. ________________________________________________________________________ _ Physician Order  Diagnostic 12 Month follow up Mammogram with Breast Ultrasound if needed.  Diagnosis: Abnormal Mammogram  3/13/2025 6:20 PM by Ashley Cobian MD on Workstation: [id number]      US Breast Left Limited  Result Date: 3/13/2025  Palpable lump correlates with a normal ridge of tissue on the left at 11:00 8 cm from the nipple. Stable appearance of postsurgical changes on the left.  ACR BI-RADS CATEGORY: 3, PROBABLY BENIGN  RECOMMENDATION: 12-month bilateral diagnostic mammogram is recommended to ensure stability of the previous biopsy site.  As no suspicious imaging correlate is identified for the patient's palpable area of concern, further clinical evaluation correlation is recommended. A repeat clinical breast exam in 2-3 months time can be considered based on clinical concern. Further decision to pursue biopsy should also be decided on clinical exam findings. It  was discussed with the patient at the area were to become firmer or larger that she should return to her physician.  CAD was utilized.  The standard false-negative rate of mammography is between 10% and 25%. Complex patterns or increased breast density will markedly elevate the false-negative rate of mammography.   A letter, in lay terminology, with the results of this exam was given to the patient at the time of the visit. ________________________________________________________________________ _ Physician Order  Diagnostic 12 Month follow up Mammogram with Breast Ultrasound if needed.  Diagnosis: Abnormal Mammogram  3/13/2025 6:20 PM by Ashley Cobian MD on Workstation: [id number]      XR Wrist 3+ View Left  Result Date: 2/15/2025  Impression: Comminuted nondisplaced distal radial fracture. Electronically Signed: Nam Goodwin MD  2/15/2025 9:08 PM EST  Workstation ID: [id number]    MRI Cervical Spine With & Without Contrast  Result Date: 12/24/2024  Impression: Stable essentially normal contrast-enhanced MRI of the cervical spine. The cervical spinal cord demonstrates no focal areas of signal abnormality. Minimal spondylosis is unchanged without associated spinal canal or neuroforaminal impingement. Electronically Signed: Taz Diaz MD  12/24/2024 2:36 PM EST  Workstation ID: [id number]    MRI Brain With & Without Contrast  Result Date: 12/24/2024  Impression: Normal contrast-enhanced MRI of the brain. Electronically Signed: Taz Diaz MD  12/24/2024 1:06 PM EST  Workstation ID: [id number]        Assessment / Plan      Assessment:   Diagnoses and all orders for this visit:    1. Closed Colles' fracture of left radius with routine healing, subsequent encounter (Primary)  -     XR Wrist 3+ View Left        Quality Metrics:   BMI:   BMI is within normal parameters. No other follow-up for BMI required.       Tobacco:   Josephine Taylor  reports that she quit smoking about 22 months ago. Her smoking use included  cigarettes. She started smoking about 20 years ago. She has a 9.2 pack-year smoking history. She has been exposed to tobacco smoke. She has never used smokeless tobacco.   Plan:  Left distal radius fracture.  I reviewed today's x-rays and clinical findings with the patient.  She is very stiff on exam.  She may discontinue the brace full-time and use it as needed.  I encouraged her to continue physical therapy and really start working on motion of the wrist and digits.  I will see her back in 4 weeks for motion check, sooner if needed.    Alyssa Worthington PA-C  [facility] [place] [facility]    Dictated using Dragon Speech Recognition.

## 2025-04-24 ENCOUNTER — OFFICE VISIT (OUTPATIENT)
Age: 35
End: 2025-04-24
Payer: MEDICAID

## 2025-04-24 VITALS
HEIGHT: 65 IN | DIASTOLIC BLOOD PRESSURE: 62 MMHG | HEART RATE: 77 BPM | BODY MASS INDEX: 20.58 KG/M2 | WEIGHT: 123.5 LBS | OXYGEN SATURATION: 96 % | SYSTOLIC BLOOD PRESSURE: 106 MMHG

## 2025-04-24 DIAGNOSIS — G89.29 CHRONIC BILATERAL LOW BACK PAIN WITH BILATERAL SCIATICA: ICD-10-CM

## 2025-04-24 DIAGNOSIS — M54.42 CHRONIC BILATERAL LOW BACK PAIN WITH BILATERAL SCIATICA: ICD-10-CM

## 2025-04-24 DIAGNOSIS — M41.9 SCOLIOSIS, UNSPECIFIED SCOLIOSIS TYPE, UNSPECIFIED SPINAL REGION: Primary | ICD-10-CM

## 2025-04-24 DIAGNOSIS — M54.41 CHRONIC BILATERAL LOW BACK PAIN WITH BILATERAL SCIATICA: ICD-10-CM

## 2025-04-24 RX ORDER — METHYLPREDNISOLONE 4 MG/1
TABLET ORAL
Qty: 1 EACH | Refills: 0 | Status: SHIPPED | OUTPATIENT
Start: 2025-04-24

## 2025-04-24 NOTE — PROGRESS NOTES
Office Note     Name: Josephine Taylor    : 1990     MRN: [id number]     Chief Complaint  Back Pain (Across the whole lower back.  Shooting up and down the back)    Subjective     History of Present Illness:    History of Present Illness  The patient presents with back pain.    Diagnosed with severe scoliosis since childhood, she experienced an acute exacerbation of lower back pain on 2025, radiating across her hips. On 2025, pain impeded her mobility, preventing her from lifting herself or rolling over in bed. She remained in bed for several hours before managing to sit up, experiencing excruciating pain upon standing. She cannot stand upright and has a forward lean. Difficulty bending to put on shoes has led to wearing slip-ons for the past week. Pain intensifies with each step, manifesting as shooting pains through the lower back and up the spine. Typically, episodes resolve within 2-3 days, but this episode has persisted for 6 days, significantly affecting daily activities.    A similar episode occurred after abdominal stent placement, associated with less than 20% blood flow to the back. A neurosurgeon deemed the condition too advanced for surgery. Currently undergoing physical therapy for a recent wrist fracture, the therapist noted a 2.5-inch hip misalignment, providing temporary relief. Considering Thrive nutritional patches for inflammation, though safety for spinal application is uncertain. Lidocaine patches have been tried. No urinary incontinence or genital sensation loss reported. Neurologist consultation scheduled for extremity numbness. Considering x-ray for scoliosis assessment.    History of steroid and lidocaine injections for 8.5-9 years, discontinued due to ineffectiveness and lack of insurance. Ablation attempt was unsuccessful. Treatments discontinued for 1.5 years. Toradol injections tried without relief. Muscle relaxers cannot be taken due to enzyme deficiency.   NSAIDs cannot be taken due to history of ulcers.            Past Medical History:   Past Medical History:   Diagnosis Date   • Anxiety    • Arthritis    • Arthritis of neck    • Back problem    • Bronchitis    • Cervical disc disorder    • Chronic abdominal pain    • Chronic nausea    • Chronic pain disorder    • Colitis    • CTS (carpal tunnel syndrome)    • CYP2D6 poor metabolizer    • Dementia    • Depression    • Difficulty walking    • Fibromyalgia, primary    • Foot laceration 2023    No ligament/tendon damage   • Fracture of wrist    • Fractures rib    • Genitofemoral neuropathy     left   • Headache, tension-type    • Insomnia    • Joint pain    • Kidney infection    • Low back pain    • Low back strain    • Memory loss    • Migraines    • Neck pain    • Neck strain    • Panic disorder    • Postconcussion syndrome    • PTSD (post-traumatic stress disorder)    • Scoliosis    • Thoracic disc disorder    • Traumatic brain injury    • Vitamin D deficiency        Past Surgical History:   Past Surgical History:   Procedure Laterality Date   • ABDOMINAL SURGERY     • BREAST BIOPSY Left     bx and excisional   • BREAST EXCISIONAL BIOPSY Left 2024   •  SECTION     • ILIAC ARTERY STENT     • OTHER SURGICAL HISTORY Left     renal vein embolization   • TRIGGER POINT INJECTION     • VASCULAR SURGERY  [id number]    Embolism       Immunizations:   Immunization History   Administered Date(s) Administered   • HPV Quadrivalent 2007   • Hepatitis A 2019, 2019   • Pneumococcal Polysaccharide (PPSV23) 2019, 2019   • Tdap 2012        Medications:     Current Outpatient Medications:   •  5-Hydroxytryptophan (5-HTP PO), Place 1 patch on the skin as directed by provider Daily., Disp: , Rfl:   •  acetaminophen (TYLENOL) 650 MG 8 hr tablet, Take 1 tablet by mouth Every 8 (Eight) Hours As Needed for Mild Pain., Disp: 30 tablet, Rfl: 0  •   clonazePAM (KlonoPIN) 2 MG tablet, TAKE 1 TABLET BY MOUTH 2 TIMES A DAY AS NEEDED OR MAY BREAK IN HALF AND TAKE 1/2 UP TO 4 TIMES A DAY, Disp: , Rfl:   •  Coenzyme Q10 (CO Q 10 PO), Take  by mouth Daily., Disp: , Rfl:   •  Cyanocobalamin (VITAMIN B-12 PO), Take  by mouth Daily., Disp: , Rfl:   •  ondansetron (ZOFRAN) 4 MG tablet, Take 1 tablet by mouth Every 8 (Eight) Hours As Needed for Nausea or Vomiting., Disp: , Rfl:   •  QUERCETIN PO, Take  by mouth Daily., Disp: , Rfl:   •  VITAMIN D PO, Take  by mouth Daily., Disp: , Rfl:   •  clonazePAM (KlonoPIN) 1 MG tablet, Take 1 tablet by mouth 3 (Three) Times a Day As Needed for Anxiety. (Patient not taking: Reported on 4/24/2025), Disp: , Rfl:   •  methylPREDNISolone (MEDROL) 4 MG dose pack, Take as directed on package instructions., Disp: 1 each, Rfl: 0    Allergies:   Allergies   Allergen Reactions   • Erenumab-Aooe Other (See Comments) and Anaphylaxis     Told to avoid due to hives, SOA with Emgality   • Galcanezumab-Gnlm Shortness Of Breath and Anaphylaxis   • Gabapentin Dizziness   • Sulfa Antibiotics Hives   • Venlafaxine Other (See Comments)     Worsened headaches and severe mood changes   • Ciprofloxacin Unknown - High Severity       Family History:   Family History   Problem Relation Age of Onset   • Anxiety disorder Mother    • Hypertension Mother    • Arthritis Mother    • Mental illness Mother    • Lupus Mother    • Fibromyalgia Mother    • Osteoporosis Mother    • Rheumatologic disease Mother    • Depression Father    • COPD Father    • Kidney disease Father    • Heart disease Father    • Heart attack Father    • Mental illness Father    • Heart failure Father    • Alcohol abuse Father    • Anxiety disorder Sister    • Migraines Sister    • Depression Sister    • Broken bones Sister    • Depression Sister    • Anxiety disorder Sister    • ADD / ADHD Sister    • Asthma Son    • Melanoma Maternal Grandmother    • Brain cancer Maternal Grandfather    • Colon  "cancer Neg Hx    • Colon polyps Neg Hx    • Esophageal cancer Neg Hx    • Ovarian cancer Neg Hx    • Endometrial cancer Neg Hx    • Breast cancer Neg Hx        Social History:   Social History     Socioeconomic History   • Marital status:    Tobacco Use   • Smoking status: Former     Current packs/day: 0.00     Average packs/day: 0.5 packs/day for 18.4 years (9.2 ttl pk-yrs)     Types: Cigarettes     Start date: 2005     Quit date: 2023     Years since quittin.8     Passive exposure: Past   • Smokeless tobacco: Never   Vaping Use   • Vaping status: Every Day   • Substances: Nicotine, Flavoring   Substance and Sexual Activity   • Alcohol use: Never   • Drug use: Yes     Types: Marijuana     Comment: Pt states that she smokes about a few times a week.   • Sexual activity: Not Currently     Partners: Male     Birth control/protection: None         Objective     Vital Signs  /62 (BP Location: Right arm, Patient Position: Sitting, Cuff Size: Adult)   Pulse 77   Ht 165.1 cm (65\")   Wt 56 kg (123 lb 8 oz)   SpO2 96%   BMI 20.55 kg/m²   Estimated body mass index is 20.55 kg/m² as calculated from the following:    Height as of this encounter: 165.1 cm (65\").    Weight as of this encounter: 56 kg (123 lb 8 oz).    BMI is within normal parameters. No other follow-up for BMI required.      Physical Exam  Vitals reviewed.   Constitutional:       Appearance: Normal appearance.      Comments: Slowed, stooped, gait   HENT:      Head: Normocephalic and atraumatic.   Cardiovascular:      Rate and Rhythm: Normal rate.   Pulmonary:      Effort: Pulmonary effort is normal. No respiratory distress.   Neurological:      General: No focal deficit present.      Mental Status: She is alert and oriented to person, place, and time.   Psychiatric:         Mood and Affect: Mood normal.         Behavior: Behavior normal.          Assessment and Plan     Diagnoses and all orders for this visit:    1. Scoliosis, " unspecified scoliosis type, unspecified spinal region (Primary)  -     Ambulatory Referral to Physical Therapy for Evaluation & Treatment  -     methylPREDNISolone (MEDROL) 4 MG dose pack; Take as directed on package instructions.  Dispense: 1 each; Refill: 0    2. Chronic bilateral low back pain with bilateral sciatica  -     Ambulatory Referral to Physical Therapy for Evaluation & Treatment  -     methylPREDNISolone (MEDROL) 4 MG dose pack; Take as directed on package instructions.  Dispense: 1 each; Refill: 0         Assessment & Plan  Acute exacerbation of chronic back pain due to scoliosis   - Pain has persisted for six days, causing significant functional impairment  - Previous interventions (steroid injections, lidocaine, ablation) were ineffective, but last pain management appointment approximately 1.5 years ago  - Unable to tolerate NSAIDs due to history of ulcers, reports Toradol shots previously ineffective, muscle relaxers too sedating   Plan:   - Prescribed steroid pack to manage inflammation  - [facility]   - Advised to continue Tylenol, max 4 g/day (two extra strength Tylenol every 6 hours)  - Encouraged home exercises and stretches  - If ineffective, consult interventional pain specialist    Follow Up  No follow-ups on file.    Patient or patient representative verbalized consent for the use of Ambient Listening during the visit with  Calista Montoya MD for chart documentation. 4/24/2025  14:13 EDT      Calista Montoya MD   [facility]  [facility] [place] [address] [address] [phone]    Please note that portions of this document may have been completed with a voice recognition program.      At [facility], we believe that sharing information builds trust and better relationships. You are receiving this note because you are receiving care at  [facility] or have recently visited. It is possible you will see health information before a provider has talked with you about it. This kind of information can be easy to misunderstand as it is a medical document. It is intended as [id number] communication. It is written in medical language and may contain abbreviations or verbiage that are unfamiliar. It may appear blunt or direct. Medical documents are intended to carry relevant information, facts as evident, and the clinical opinion of the practitioner.  To help you fully understand what it means for your health, we urge you to discuss this note with your provider.

## 2025-05-12 ENCOUNTER — TELEPHONE (OUTPATIENT)
Dept: NEUROLOGY | Facility: CLINIC | Age: 35
End: 2025-05-12
Payer: MEDICAID

## 2025-05-21 ENCOUNTER — OFFICE VISIT (OUTPATIENT)
Dept: ORTHOPEDIC SURGERY | Facility: CLINIC | Age: 35
End: 2025-05-21
Payer: MEDICAID

## 2025-05-21 VITALS
WEIGHT: 123 LBS | DIASTOLIC BLOOD PRESSURE: 84 MMHG | BODY MASS INDEX: 20.49 KG/M2 | HEIGHT: 65 IN | SYSTOLIC BLOOD PRESSURE: 122 MMHG

## 2025-05-21 DIAGNOSIS — S52.532D CLOSED COLLES' FRACTURE OF LEFT RADIUS WITH ROUTINE HEALING, SUBSEQUENT ENCOUNTER: ICD-10-CM

## 2025-05-21 DIAGNOSIS — M25.532 LEFT WRIST PAIN: Primary | ICD-10-CM

## 2025-05-21 PROCEDURE — 99024 POSTOP FOLLOW-UP VISIT: CPT | Performed by: PHYSICIAN ASSISTANT

## 2025-05-21 NOTE — PROGRESS NOTES
[facility] [place] Office Follow Up       Office Follow Up Visit       Patient Name: Josephine Taylor    Chief Complaint:   Chief Complaint   Patient presents with    Follow-up     1 month follow up - Closed Colles' fracture of left radius with routine healing       Referring Physician: No ref. provider found    History of Present Illness:   Josephine Taylor returns to clinic today for f/up left distal radius fracture.  She reports PT has been doing well.  She was having improved motion and has a decrease in flexion recently. No pain, no new problems.      Subjective     Review of Systems   Constitutional: Negative.    HENT: Negative.     Eyes: Negative.    Respiratory: Negative.     Cardiovascular: Negative.    Gastrointestinal: Negative.    Endocrine: Negative.    Genitourinary: Negative.    Musculoskeletal:  Positive for arthralgias.   Skin: Negative.    Allergic/Immunologic: Negative.    Neurological: Negative.    Hematological: Negative.    Psychiatric/Behavioral: Negative.          I have reviewed and updated the following portions of the patient's history and review of systems: allergies, current medications, past family history, past medical history, past social history, past surgical history and problem list.    Medications:   Current Outpatient Medications:     5-Hydroxytryptophan (5-HTP PO), Place 1 patch on the skin as directed by provider Daily., Disp: , Rfl:     acetaminophen (TYLENOL) 650 MG 8 hr tablet, Take 1 tablet by mouth Every 8 (Eight) Hours As Needed for Mild Pain., Disp: 30 tablet, Rfl: 0    clonazePAM (KlonoPIN) 1 MG tablet, Take 1 tablet by mouth 3 (Three) Times a Day As Needed for Anxiety., Disp: , Rfl:     clonazePAM (KlonoPIN) 2 MG tablet, TAKE 1 TABLET BY MOUTH 2 TIMES A DAY AS NEEDED OR MAY BREAK IN HALF AND TAKE 1/2 UP TO 4 TIMES A DAY, Disp: , Rfl:     Coenzyme Q10 (CO Q 10 PO), Take  by mouth Daily., Disp: , Rfl:      "Cyanocobalamin (VITAMIN B-12 PO), Take  by mouth Daily., Disp: , Rfl:     methylPREDNISolone (MEDROL) 4 MG dose pack, Take as directed on package instructions., Disp: 1 each, Rfl: 0    ondansetron (ZOFRAN) 4 MG tablet, Take 1 tablet by mouth Every 8 (Eight) Hours As Needed for Nausea or Vomiting., Disp: , Rfl:     QUERCETIN PO, Take  by mouth Daily., Disp: , Rfl:     VITAMIN D PO, Take  by mouth Daily., Disp: , Rfl:     Allergies:   Allergies   Allergen Reactions    Erenumab-Aooe Other (See Comments) and Anaphylaxis     Told to avoid due to hives, SOA with Emgality    Galcanezumab-Gnlm Shortness Of Breath and Anaphylaxis    Gabapentin Dizziness    Sulfa Antibiotics Hives    Venlafaxine Other (See Comments)     Worsened headaches and severe mood changes    Ciprofloxacin Unknown - High Severity         Objective      Vital Signs:   Vitals:    05/21/25 1103   BP: 122/84   Weight: 55.8 kg (123 lb)   Height: 165.1 cm (65\")       Ortho Exam:  Left wrist exam: nontender to palpation. Patient able to make a composite fist with good strength.  EPL intrinsics intact.  Stiffness as expected.  Neurovascular intact distally.    Results Review:  XR Wrist 3+ View Left  Left Wrist X-Ray    Indication: Pain    Views:  AP, Lateral, and Oblique     Comparison: 4/9/25    Findings:  Fracture of the left distal radius  Normal soft tissues  Normal joint spaces    Impression:  X-ray of the left wrist personally reviewed by me.  Fracture   of the distal radius with healing.             Assessment / Plan      Assessment:   Diagnoses and all orders for this visit:    1. Left wrist pain (Primary)  -     XR Wrist 3+ View Left    2. Closed Colles' fracture of left radius with routine healing, subsequent encounter        Quality Metrics:   BMI:   BMI is within normal parameters. No other follow-up for BMI required.       Tobacco:   Josephine Taylor  reports that she quit smoking about 1 years ago. Her smoking use included cigarettes. She " started smoking about 20 years ago. She has a 9.2 pack-year smoking history. She has been exposed to tobacco smoke. She has never used smokeless tobacco.         Plan:  Nonoperative treatment of left distal radius fracture.  Patient is nontender on exam.  X-rays show the fracture is healing well.  Plan today is she discontinue the brace and will continue with physical therapy working on motion.  She will return to see us as needed.    Alyssa Worthington PA-C  [facility]    Dictated using Dragon Speech Recognition.